# Patient Record
Sex: FEMALE | Race: WHITE | NOT HISPANIC OR LATINO | Employment: STUDENT | URBAN - METROPOLITAN AREA
[De-identification: names, ages, dates, MRNs, and addresses within clinical notes are randomized per-mention and may not be internally consistent; named-entity substitution may affect disease eponyms.]

---
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
## 2022-01-04 DIAGNOSIS — G43.709 CHRONIC MIGRAINE WITHOUT AURA WITHOUT STATUS MIGRAINOSUS, NOT INTRACTABLE: Primary | ICD-10-CM

## 2022-01-04 RX ORDER — BUTALBITAL, ACETAMINOPHEN AND CAFFEINE 50; 325; 40 MG/1; MG/1; MG/1
1 CAPSULE ORAL 4 TIMES DAILY PRN
Qty: 20 CAPSULE | Refills: 0 | Status: SHIPPED | OUTPATIENT
Start: 2022-01-04 | End: 2022-01-04

## 2022-01-04 RX ORDER — BUTALBITAL, ACETAMINOPHEN AND CAFFEINE 50; 325; 40 MG/1; MG/1; MG/1
1 CAPSULE ORAL 4 TIMES DAILY PRN
Qty: 20 CAPSULE | Refills: 0 | Status: SHIPPED | OUTPATIENT
Start: 2022-01-04 | End: 2022-08-18

## 2022-01-04 RX ORDER — PROMETHAZINE HYDROCHLORIDE 25 MG/1
25 TABLET ORAL EVERY 6 HOURS PRN
Qty: 30 TABLET | Refills: 0 | Status: SHIPPED | OUTPATIENT
Start: 2022-01-04 | End: 2022-01-04

## 2022-01-04 RX ORDER — PROMETHAZINE HYDROCHLORIDE 25 MG/1
25 TABLET ORAL EVERY 6 HOURS PRN
Qty: 30 TABLET | Refills: 0 | Status: SHIPPED | OUTPATIENT
Start: 2022-01-04 | End: 2022-08-18

## 2022-01-05 ENCOUNTER — TELEPHONE (OUTPATIENT)
Dept: NEUROLOGY | Facility: CLINIC | Age: 22
End: 2022-01-05
Payer: COMMERCIAL

## 2022-01-05 NOTE — TELEPHONE ENCOUNTER
CaseId:[id number];Status:Approved;Review Type:Prior Auth;Coverage Start Date:01/01/2022;Coverage End Date:01/05/2023;

## 2022-01-05 NOTE — TELEPHONE ENCOUNTER
CaseId:[id number];Status:Approved;Review Type:Prior Auth;Coverage Start Date:01/01/2022;Coverage End Date:01/05/2023;

## 2022-01-05 NOTE — TELEPHONE ENCOUNTER
Sheba Menezes ([place]: [id number])  Rx #: [id number]  Reyvow 50MG tablets     Form  [employer] Electronic PA Form (2017 NCPDP)

## 2022-01-05 NOTE — TELEPHONE ENCOUNTER
Sheba Menezes ([place]: [id number])  Rx #: [id number]  Nurtec 75MG dispersible tablets     Form  [employer] Electronic PA Form (2017 NCPDP)

## 2022-01-13 ENCOUNTER — TELEPHONE (OUTPATIENT)
Dept: NEUROLOGY | Facility: CLINIC | Age: 22
End: 2022-01-13
Payer: COMMERCIAL

## 2022-01-13 NOTE — TELEPHONE ENCOUNTER
----- Message from Sintia Nuno sent at 1/13/2022  4:32 PM CST -----  Contact: Melba (mother) @ [phone]  Pt is scheduled for Botox on tomorrow.  Pts mother is calling to make sure Dr Dailey received the medication so she does not make a wasted trip from [place].  Pls call.

## 2022-01-14 ENCOUNTER — PROCEDURE VISIT (OUTPATIENT)
Dept: NEUROLOGY | Facility: CLINIC | Age: 22
End: 2022-01-14
Payer: COMMERCIAL

## 2022-01-14 VITALS
HEIGHT: 61 IN | DIASTOLIC BLOOD PRESSURE: 69 MMHG | HEART RATE: 96 BPM | BODY MASS INDEX: 23.58 KG/M2 | SYSTOLIC BLOOD PRESSURE: 108 MMHG

## 2022-01-14 DIAGNOSIS — G43.709 CHRONIC MIGRAINE WITHOUT AURA WITHOUT STATUS MIGRAINOSUS, NOT INTRACTABLE: Primary | ICD-10-CM

## 2022-01-14 PROCEDURE — 64615 CHEMODENERV MUSC MIGRAINE: CPT | Mod: S$GLB,,, | Performed by: PSYCHIATRY & NEUROLOGY

## 2022-01-14 PROCEDURE — 64615 PR CHEMODENERVATION OF MUSCLE FOR CHRONIC MIGRAINE: ICD-10-PCS | Mod: S$GLB,,, | Performed by: PSYCHIATRY & NEUROLOGY
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
## 2022-01-22 DIAGNOSIS — N92.6 IRREGULAR PERIODS/MENSTRUAL CYCLES: ICD-10-CM

## 2022-01-24 RX ORDER — NORETHINDRONE ACETATE AND ETHINYL ESTRADIOL AND FERROUS FUMARATE 1MG-20(24)
1 KIT ORAL DAILY
Qty: 84 TABLET | Refills: 3 | Status: SHIPPED | OUTPATIENT
Start: 2022-01-24 | End: 2022-03-25 | Stop reason: SDUPTHER

## 2022-01-28 ENCOUNTER — PATIENT MESSAGE (OUTPATIENT)
Dept: NEUROLOGY | Facility: CLINIC | Age: 22
End: 2022-01-28
Payer: COMMERCIAL

## 2022-02-11 ENCOUNTER — TELEPHONE (OUTPATIENT)
Dept: SPORTS MEDICINE | Facility: CLINIC | Age: 22
End: 2022-02-11
Payer: COMMERCIAL

## 2022-02-11 NOTE — TELEPHONE ENCOUNTER
----- Message from Paradise Moya sent at 2/11/2022  8:39 AM CST -----  Regarding: Appt Access  Pt mother called to confirm if records were received from [place] with Dr. Banks Office? Requesting a call back.        [phone] ([place])

## 2022-02-11 NOTE — TELEPHONE ENCOUNTER
I called and left a message informing the patient's mother that we did not receive any documents. I advised that they get the documents, any imaging on a disc, etc. And bring it with them for the appointment on Monday. She was encouraged to return the call if needed

## 2022-02-14 ENCOUNTER — TELEPHONE (OUTPATIENT)
Dept: SPORTS MEDICINE | Facility: CLINIC | Age: 22
End: 2022-02-14
Payer: COMMERCIAL

## 2022-02-14 NOTE — TELEPHONE ENCOUNTER
Spoke to patients mother and informed her that the records still hasn't been received per Елена. Mom stated that she will get the records herself and bring them with her the the appointment that was rescheduled to 02/21.

## 2022-02-14 NOTE — TELEPHONE ENCOUNTER
----- Message from Daxa Ponce sent at 2/14/2022  8:06 AM CST -----  Pt mom would like to know if the records was received from [place] so they can come to the appt for today at 11am. Mom states the dr office says they just sent it over. Mom states if the records was not received Dr. Galvan wont see the pt. Pt would like a call back.    [phone] MOM

## 2022-02-21 ENCOUNTER — OFFICE VISIT (OUTPATIENT)
Dept: SPORTS MEDICINE | Facility: CLINIC | Age: 22
End: 2022-02-21
Payer: COMMERCIAL

## 2022-02-21 ENCOUNTER — HOSPITAL ENCOUNTER (OUTPATIENT)
Dept: RADIOLOGY | Facility: HOSPITAL | Age: 22
Discharge: HOME | End: 2022-02-21
Attending: ORTHOPAEDIC SURGERY
Payer: COMMERCIAL

## 2022-02-21 VITALS
DIASTOLIC BLOOD PRESSURE: 76 MMHG | HEIGHT: 61 IN | HEART RATE: 101 BPM | SYSTOLIC BLOOD PRESSURE: 118 MMHG | BODY MASS INDEX: 26.43 KG/M2 | WEIGHT: 140 LBS

## 2022-02-21 DIAGNOSIS — M25.551 RIGHT HIP PAIN: Primary | ICD-10-CM

## 2022-02-21 DIAGNOSIS — M25.551 RIGHT HIP PAIN: ICD-10-CM

## 2022-02-21 PROCEDURE — 1160F PR REVIEW ALL MEDS BY PRESCRIBER/CLIN PHARMACIST DOCUMENTED: ICD-10-PCS | Mod: CPTII,S$GLB,, | Performed by: ORTHOPAEDIC SURGERY

## 2022-02-21 PROCEDURE — 73503 X-RAY EXAM HIP UNI 4/> VIEWS: CPT | Mod: TC,RT

## 2022-02-21 PROCEDURE — 3008F PR BODY MASS INDEX (BMI) DOCUMENTED: ICD-10-PCS | Mod: CPTII,S$GLB,, | Performed by: ORTHOPAEDIC SURGERY

## 2022-02-21 PROCEDURE — 99204 OFFICE O/P NEW MOD 45 MIN: CPT | Mod: S$GLB,,, | Performed by: ORTHOPAEDIC SURGERY

## 2022-02-21 PROCEDURE — 1159F MED LIST DOCD IN RCRD: CPT | Mod: CPTII,S$GLB,, | Performed by: ORTHOPAEDIC SURGERY

## 2022-02-21 PROCEDURE — 73503 X-RAY EXAM HIP UNI 4/> VIEWS: CPT | Mod: 26,RT,, | Performed by: RADIOLOGY

## 2022-02-21 PROCEDURE — 99204 PR OFFICE/OUTPT VISIT, NEW, LEVL IV, 45-59 MIN: ICD-10-PCS | Mod: S$GLB,,, | Performed by: ORTHOPAEDIC SURGERY

## 2022-02-21 PROCEDURE — 1160F RVW MEDS BY RX/DR IN RCRD: CPT | Mod: CPTII,S$GLB,, | Performed by: ORTHOPAEDIC SURGERY

## 2022-02-21 PROCEDURE — 99999 PR PBB SHADOW E&M-EST. PATIENT-LVL IV: ICD-10-PCS | Mod: PBBFAC,,, | Performed by: ORTHOPAEDIC SURGERY

## 2022-02-21 PROCEDURE — 3078F PR MOST RECENT DIASTOLIC BLOOD PRESSURE < 80 MM HG: ICD-10-PCS | Mod: CPTII,S$GLB,, | Performed by: ORTHOPAEDIC SURGERY

## 2022-02-21 PROCEDURE — 3078F DIAST BP <80 MM HG: CPT | Mod: CPTII,S$GLB,, | Performed by: ORTHOPAEDIC SURGERY

## 2022-02-21 PROCEDURE — 99999 PR PBB SHADOW E&M-EST. PATIENT-LVL IV: CPT | Mod: PBBFAC,,, | Performed by: ORTHOPAEDIC SURGERY

## 2022-02-21 PROCEDURE — 3074F SYST BP LT 130 MM HG: CPT | Mod: CPTII,S$GLB,, | Performed by: ORTHOPAEDIC SURGERY

## 2022-02-21 PROCEDURE — 1159F PR MEDICATION LIST DOCUMENTED IN MEDICAL RECORD: ICD-10-PCS | Mod: CPTII,S$GLB,, | Performed by: ORTHOPAEDIC SURGERY

## 2022-02-21 PROCEDURE — 73503 XR HIP WITH PELVIS WHEN PERFORMED, 4 OR MORE VIEWS RIGHT: ICD-10-PCS | Mod: 26,RT,, | Performed by: RADIOLOGY

## 2022-02-21 PROCEDURE — 3008F BODY MASS INDEX DOCD: CPT | Mod: CPTII,S$GLB,, | Performed by: ORTHOPAEDIC SURGERY

## 2022-02-21 PROCEDURE — 3074F PR MOST RECENT SYSTOLIC BLOOD PRESSURE < 130 MM HG: ICD-10-PCS | Mod: CPTII,S$GLB,, | Performed by: ORTHOPAEDIC SURGERY

## 2022-02-21 NOTE — PROGRESS NOTES
CC: right hip pain    HPI:   Sheba Menezes is a pleasant 21 y.o. patient who reports to clinic with right hip pain. She had a right hip arthroscopy, synovectomy, femoroplasty, and partial iliopsoas release in 2017 with a Dr Banks in [place].  She did well after but did not have complete relief of her pain.  She localizes her pain to her lower back and the lateral aspect of her leg.  She has had several greater trochanter bursa injections without relief.  Musical theater major, active in dance, has pain with dancing.     Affecting ADLs and exercising    Running activity worst    SANE: 60%    Review of Systems   Constitution: Negative. Negative for chills, fever and night sweats.   HENT: Negative for congestion and headaches.    Eyes: Negative for blurred vision, left vision loss and right vision loss.   Cardiovascular: Negative for chest pain and syncope.   Respiratory: Negative for cough and shortness of breath.    Endocrine: Negative for polydipsia, polyphagia and polyuria.   Hematologic/Lymphatic: Negative for bleeding problem. Does not bruise/bleed easily.   Skin: Negative for dry skin, itching and rash.   Musculoskeletal: Negative for falls and muscle weakness.   Gastrointestinal: Negative for abdominal pain and bowel incontinence.   Genitourinary: Negative for bladder incontinence and nocturia.   Neurological: Negative for disturbances in coordination, loss of balance and seizures.   Psychiatric/Behavioral: Negative for depression. The patient does not have insomnia.    Allergic/Immunologic: Negative for hives and persistent infections.     PAST MEDICAL HISTORY:   Past Medical History:   Diagnosis Date    Anxiety     Borderline diabetes     Depression     Migraine     Polycystic ovary      PAST SURGICAL HISTORY:   Past Surgical History:   Procedure Laterality Date    HIP SURGERY       FAMILY HISTORY:   Family History   Problem Relation Age of Onset    Breast cancer Paternal Aunt      SOCIAL  HISTORY:   Social History     Socioeconomic History    Marital status: Single   Tobacco Use    Smoking status: Never Smoker    Smokeless tobacco: Never Used   Substance and Sexual Activity    Alcohol use: Yes     Comment: rare    Drug use: Never    Sexual activity: Never     Birth control/protection: OCP       MEDICATIONS:   Current Outpatient Medications:     butalbital-acetaminophen-caff -40 mg -40 mg Cap, Take 1 capsule by mouth 4 (four) times daily as needed (headache)., Disp: 20 capsule, Rfl: 0    EScitalopram oxalate (LEXAPRO) 5 MG Tab, Take 5 mg by mouth once daily., Disp: , Rfl:     famotidine (PEPCID) 20 MG tablet, Take 20 mg by mouth 2 (two) times daily., Disp: , Rfl:     galcanezumab-gnlm (EMGALITY PEN) 120 mg/mL PnIj, Inject 120 mg into the skin every 28 days., Disp: 1 mL, Rfl: 12    lasmiditan (REYVOW) tablet 50 mg, Take 50 mg by mouth daily as needed (migraine)., Disp: 8 tablet, Rfl: 4    levothyroxine (SYNTHROID) 50 MCG tablet, Take 50 mcg by mouth every morning., Disp: , Rfl:     magnesium oxide (MAG-OX) 400 mg (241.3 mg magnesium) tablet, Take 1 tablet (400 mg total) by mouth 2 (two) times daily., Disp: 60 tablet, Rfl: 12    norethindrone-e.estradioL-iron (MIBELAS 24 FE) 1 mg-20 mcg(24) /75 mg (4) Chew, Take 1 tablet by mouth once daily., Disp: 84 tablet, Rfl: 3    ONEXTON 1.2 %(1 % base) -3.75 % GlwP, Apply 1 application topically every evening. Apply to affected area, Disp: , Rfl:     phentermine (ADIPEX-P) 37.5 mg tablet, Take 37.5 mg by mouth once daily., Disp: , Rfl:     PLEXION 9.8-4.8 % Clsr, WASH BACK AND CHEST EVERY DAY TO TWICE DAILY, Disp: , Rfl:     promethazine (PHENERGAN) 25 MG tablet, Take 1 tablet (25 mg total) by mouth every 6 (six) hours as needed for Nausea., Disp: 30 tablet, Rfl: 0    rimegepant 75 mg odt, Take 1 tablet (75 mg total) by mouth daily as needed for Migraine. Place ODT tablet on the tongue; alternatively the ODT tablet may be placed  "under the tongue, Disp: 8 tablet, Rfl: 11    XHANCE 93 mcg/actuation AerB, USE 1 SPRAY IN EACH NOSTRIL TWICE DAILY AS DIRECTED, Disp: , Rfl:     Current Facility-Administered Medications:     onabotulinumtoxina injection 200 Units, 200 Units, Intramuscular, Q90 Days, Galen Dailey MD  ALLERGIES: Review of patient's allergies indicates:  No Known Allergies    VITAL SIGNS: /76   Pulse 101   Ht 5' 1" (1.549 m)   Wt 63.5 kg (140 lb)   BMI 26.45 kg/m²        PHYSICAL EXAM /  HIP  PHYSICAL EXAMINATION  General:  The patient is alert and oriented x 3.  Mood is pleasant.  Observation of ears, eyes and nose reveal no gross abnormalities.  HEENT: NCAT, sclera nonicteric  Lungs: Respirations are equal and unlabored..    right HIP EXAMINATION     OBSERVATION / INSPECTION  Gait:   Nonantalgic   Alignment:  Neutral   Scars:   None   Muscle atrophy: None   Effusion:  None   Warmth:  None   Discoloration:   None   Leg lengths:   Equal   Pelvis:   Level     TENDERNESS / CREPITUS (T/C):      T / C  Trochanteric bursa   - / -  Piriformis    - / -  SI joint    + / -  Psoas tendon   - / -  Rectus insertion  - / -  Adductor insertion  - / -  Pubic symphysis  - / -  Lumbar spine   + / -    ROM: (* = pain)    Flexion:    120 degrees  External rotation: 40 degrees  Internal rotation with axial load: 30 degrees  Internal rotation without axial load: 40 degrees  Abduction:  45 degrees  Adduction:   20 degrees    SPECIAL TESTS:  Pain w/ forced internal rotation (FADIR): -   Pain w/ forced external rotation (AL): Absent   Circumduction test:    -  Stinchfield test:    Negative   Log roll:      Negative   Snapping hip (internal):   Negative   Sit-up pain:     Negative   Resisted sit-up pain:    Negative   Resisted sit-up with adductor contraction pain:  Negative   Step-down test:    +  Trendelenburg test:    Negative  Bridge test     +     EXTREMITY NEURO-VASCULAR EXAMINATION:   Sensation:  Grossly intact to light touch all " dermatomal regions.   Motor Function:  Fully intact motor function at hip, knee, foot and ankle    DTRs;  quadriceps and  achilles 2+.  No clonus and downgoing Babinski.    Vascular status:  DP and PT pulses 2+, brisk capillary refill, symmetric.    Skin:  intact, compartments soft.    OTHER FINDINGS:  Positive AL test (SI joint)  Weakness with straight leg raise    XRAYS:  CE angle: 25  Crossover: Neg  CAM: Neg  Joint space narrowing: none    ASSESSMENT:    1. right hip abd/core weakness    PLAN:  1. PT for right hip abd/core strengthing  2. NSAIDS prn  3. Avoid exacerbating activities  4. All questions were answered, pt will contact us for questions or concerns in the interim.

## 2022-02-25 ENCOUNTER — TELEPHONE (OUTPATIENT)
Dept: SPORTS MEDICINE | Facility: CLINIC | Age: 22
End: 2022-02-25
Payer: COMMERCIAL

## 2022-02-25 NOTE — TELEPHONE ENCOUNTER
I called the number provided and left a voicemail encouraging them to call back with a location name, address, phone and fax number

## 2022-02-25 NOTE — TELEPHONE ENCOUNTER
----- Message from Ely Sharma sent at 2/25/2022  4:00 PM CST -----    Please place referral for patients physical therapy at your earliest opportunity  Please notify patients grandmother and they will call to schedule    [place] - Grand mother @# [phone]

## 2022-02-28 ENCOUNTER — TELEPHONE (OUTPATIENT)
Dept: SPORTS MEDICINE | Facility: CLINIC | Age: 22
End: 2022-02-28
Payer: COMMERCIAL

## 2022-02-28 NOTE — TELEPHONE ENCOUNTER
----- Message from Yomaira Morgan sent at 2/28/2022 11:23 AM CST -----  Pt mom calling to speak with staff      [phone] (home) [phone] (work)

## 2022-03-02 ENCOUNTER — TELEPHONE (OUTPATIENT)
Dept: SPORTS MEDICINE | Facility: CLINIC | Age: 22
End: 2022-03-02
Payer: COMMERCIAL

## 2022-03-02 DIAGNOSIS — M25.551 RIGHT HIP PAIN: Primary | ICD-10-CM

## 2022-03-02 NOTE — TELEPHONE ENCOUNTER
----- Message from Estee Penny sent at 3/2/2022 11:10 AM CST -----  Contact: Pt  Pts mother returning a call back from [facility]. Mother has left several messages regarding call back.    Confirmed contact info below:  Contact Name: Sheba Menezes  Phone Number: [phone]

## 2022-03-22 DIAGNOSIS — G43.709 CHRONIC MIGRAINE WITHOUT AURA WITHOUT STATUS MIGRAINOSUS, NOT INTRACTABLE: Primary | ICD-10-CM

## 2022-03-31 ENCOUNTER — TELEPHONE (OUTPATIENT)
Dept: NEUROLOGY | Facility: CLINIC | Age: 22
End: 2022-03-31
Payer: COMMERCIAL

## 2022-03-31 NOTE — TELEPHONE ENCOUNTER
----- Message from Estee Penny sent at 3/31/2022  3:10 PM CDT -----  Contact: Pt  Pts requesting a call back regarding coming at a earlier time for appt date.. Pt is scheduled for 1 but mother is concerned about pt driving to [place] that time of evening.. Pt will be available anytime of day     Confirmed contact info below:  Contact Name:  Mom   Phone Number: [phone]

## 2022-04-08 ENCOUNTER — TELEPHONE (OUTPATIENT)
Dept: PHARMACY | Facility: CLINIC | Age: 22
End: 2022-04-08
Payer: COMMERCIAL

## 2022-04-14 ENCOUNTER — PROCEDURE VISIT (OUTPATIENT)
Dept: NEUROLOGY | Facility: CLINIC | Age: 22
End: 2022-04-14
Payer: COMMERCIAL

## 2022-04-14 VITALS
SYSTOLIC BLOOD PRESSURE: 97 MMHG | BODY MASS INDEX: 26.33 KG/M2 | HEART RATE: 104 BPM | DIASTOLIC BLOOD PRESSURE: 66 MMHG | WEIGHT: 139.44 LBS | HEIGHT: 61 IN

## 2022-04-14 DIAGNOSIS — G43.709 CHRONIC MIGRAINE WITHOUT AURA WITHOUT STATUS MIGRAINOSUS, NOT INTRACTABLE: ICD-10-CM

## 2022-04-14 PROCEDURE — 64615 PR CHEMODENERVATION OF MUSCLE FOR CHRONIC MIGRAINE: ICD-10-PCS | Mod: S$GLB,,, | Performed by: PSYCHIATRY & NEUROLOGY

## 2022-04-14 PROCEDURE — 64615 CHEMODENERV MUSC MIGRAINE: CPT | Mod: S$GLB,,, | Performed by: PSYCHIATRY & NEUROLOGY
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
## 2022-04-21 DIAGNOSIS — G43.709 CHRONIC MIGRAINE WITHOUT AURA WITHOUT STATUS MIGRAINOSUS, NOT INTRACTABLE: Primary | ICD-10-CM

## 2022-04-21 RX ORDER — ETODOLAC 400 MG/1
400 TABLET, FILM COATED ORAL 3 TIMES DAILY PRN
Qty: 20 TABLET | Refills: 12 | Status: SHIPPED | OUTPATIENT
Start: 2022-04-21 | End: 2022-08-18

## 2022-04-21 NOTE — TELEPHONE ENCOUNTER
Call placed to patient mother. Patient mother states that the patient will be out of town in [place] from may through august.     Requesting samples from the office for emgality as she states that she is concerned for her daughter having to travel to and from a pharmacy while in [place]. Informed her that [facility] does not carry samples as it's against policy. Discussed with her option for mail delivery pharmacy. To research which pharmacy patient would prefer and notify office for script to be sent    Patient to need botox while out of state. Discussed with her that she will need to locate at [facility] that would complete her botox and notify office of location and fax number to send referral. She states that Dr. Dailey said they would send the botox to the new provider. I discuess with patient that we will not be able to mail any of our botox supply to the new provider. The new provider would have to have their own supply/etc.     She states that the patient requested her mother discuss with clinic need for new or additional rescue as she states the reyvow and the nurtec are no longer working for her migraines. She states that whatever alternative that she is given please not make her sleepy. Informed her that this request will be forwarded to Dr. Dailey

## 2022-04-21 NOTE — TELEPHONE ENCOUNTER
Call placed to patient. Informed of new script etodolac 400mg TID PRN headaches. Advised to take with food and to avoid OTC NSAIDS. Verbalized understanding and denied further needs at this time.

## 2022-04-21 NOTE — TELEPHONE ENCOUNTER
----- Message from Shailesh Marrufo sent at 4/21/2022 11:39 AM CDT -----  Regarding: call bk from the nurse      The Pt's mother states that she has permission to speak with you on behalf of the Pt and would like for Lizeth to call her back.    The caller states that she sent a msg to you yesterday    Ph # [phone] (Melba English)

## 2022-04-22 NOTE — TELEPHONE ENCOUNTER
Call placed to Pt mother.     Mother provided new neuro for while she's in [place]  Jai Gely Fax: [phone]  Ref: Sukumar    She also states that she will not be needing mail delivery as [facility] pharmacy states they can override and get her the summer supply before she leaves. Informed her to call back if she has any trouble.     Mother/patient to call in summer to schedule next botox appt

## 2022-04-22 NOTE — TELEPHONE ENCOUNTER
Zuleyka Thompson, Patient Care Assistant  Lizeth Egan, RN  Caller: Pt mother (Yesterday,  3:25 PM)  Pt mother is requesting a call back in regards to some information that she has and needs to pass on to you.       Pt mother @ [phone]

## 2022-05-13 ENCOUNTER — TELEPHONE (OUTPATIENT)
Dept: NEUROLOGY | Facility: CLINIC | Age: 22
End: 2022-05-13

## 2022-05-13 NOTE — TELEPHONE ENCOUNTER
----- Message from Daxa Ponce sent at 5/13/2022  3:43 PM CDT -----  Contact: [phone] Mom  Pt mom would like to let Sydnie know she needs her to give her a call in reference to the medication that was given to her daughter a month ago.    [phone] Mom

## 2022-05-16 ENCOUNTER — TELEPHONE (OUTPATIENT)
Dept: NEUROLOGY | Facility: CLINIC | Age: 22
End: 2022-05-16
Payer: COMMERCIAL

## 2022-05-16 NOTE — TELEPHONE ENCOUNTER
----- Message from Mario Ontiveros sent at 5/16/2022 12:16 PM CDT -----  Regarding: Pt refill  Contact: Regina @ [phone]  Pt's mother (Regina) called in regards to medicine: etodolac (LODINE) 400 MG tablet and needing a refill but also has questions. Please call pt's mother.

## 2022-05-17 DIAGNOSIS — R51.9 INTRACTABLE HEADACHE, UNSPECIFIED CHRONICITY PATTERN, UNSPECIFIED HEADACHE TYPE: Primary | ICD-10-CM

## 2022-05-17 RX ORDER — DIHYDROERGOTAMINE MESYLATE 4 MG/ML
1.5 SPRAY, METERED NASAL 2 TIMES DAILY PRN
Qty: 6 ML | Refills: 4 | Status: SHIPPED | OUTPATIENT
Start: 2022-05-17 | End: 2022-05-18 | Stop reason: SDUPTHER

## 2022-05-17 NOTE — TELEPHONE ENCOUNTER
Call placed to suhas. Mother states that she is just waking up and requesting a follow up call later today.

## 2022-05-18 DIAGNOSIS — R51.9 INTRACTABLE HEADACHE, UNSPECIFIED CHRONICITY PATTERN, UNSPECIFIED HEADACHE TYPE: ICD-10-CM

## 2022-05-18 RX ORDER — DIHYDROERGOTAMINE MESYLATE 4 MG/ML
1.5 SPRAY, METERED NASAL 2 TIMES DAILY PRN
Qty: 6 ML | Refills: 4 | Status: CANCELLED | OUTPATIENT
Start: 2022-05-18

## 2022-05-18 RX ORDER — DIHYDROERGOTAMINE MESYLATE 4 MG/ML
1.5 SPRAY, METERED NASAL 2 TIMES DAILY PRN
Qty: 6 ML | Refills: 4 | Status: SHIPPED | OUTPATIENT
Start: 2022-05-18 | End: 2022-07-08 | Stop reason: SDUPTHER

## 2022-05-20 ENCOUNTER — TELEPHONE (OUTPATIENT)
Dept: NEUROLOGY | Facility: CLINIC | Age: 22
End: 2022-05-20
Payer: COMMERCIAL

## 2022-05-20 DIAGNOSIS — R51.9 INTRACTABLE HEADACHE, UNSPECIFIED CHRONICITY PATTERN, UNSPECIFIED HEADACHE TYPE: ICD-10-CM

## 2022-05-20 NOTE — TELEPHONE ENCOUNTER
----- Message from Lorraine Tello MA sent at 5/20/2022  1:45 PM CDT -----  Regarding: FW: refill  Contact: Delmis @ [phone]    ----- Message -----  From: Tremontana Chevalier  Sent: 5/20/2022   1:32 PM CDT  To: Ashlie Aaron Staff  Subject: refill                                           Mother (Delmis) calling to get Pharm Auth for insurance approval for dihydroergotamine (TRUDHESA) 0.725 mg/pump act. (4 mg/mL) Harshil Benites says pt is leaving to go out of town and need this RX as soon as possible to make sure the medication works for pt. Pls call @ [phone].      [id number] - [place], [place] - [address]  [address] [address]  Phone: [phone] Fax: [phone]

## 2022-05-20 NOTE — TELEPHONE ENCOUNTER
Sheba Menezes Garvey: [id number]     Drug  Trudhesa 0.725MG/ACT aerosol    Form  [employer] Electronic PA Form (2017 NCPDP)    CaseId:[id number];Status:Approved;Review Type:Prior Auth;Coverage Start Date:05/20/2022;Coverage End Date:05/20/2023;

## 2022-05-26 DIAGNOSIS — G43.001 MIGRAINE WITHOUT AURA AND WITH STATUS MIGRAINOSUS, NOT INTRACTABLE: ICD-10-CM

## 2022-05-26 DIAGNOSIS — N92.6 IRREGULAR PERIODS/MENSTRUAL CYCLES: ICD-10-CM

## 2022-05-26 RX ORDER — GALCANEZUMAB 120 MG/ML
120 INJECTION, SOLUTION SUBCUTANEOUS
Qty: 1 ML | Refills: 3 | Status: SHIPPED | OUTPATIENT
Start: 2022-05-26 | End: 2022-09-12 | Stop reason: SDUPTHER

## 2022-05-26 RX ORDER — NORETHINDRONE ACETATE AND ETHINYL ESTRADIOL AND FERROUS FUMARATE 1MG-20(24)
1 KIT ORAL DAILY
Qty: 28 TABLET | Refills: 1 | Status: SHIPPED | OUTPATIENT
Start: 2022-05-26 | End: 2022-06-02 | Stop reason: SDUPTHER

## 2022-05-26 NOTE — TELEPHONE ENCOUNTER
----- Message from Danielle Hoyt sent at 5/26/2022 12:55 PM CDT -----  Regarding: URGENT  Contact: Liz  @[phone]  Pt mother needs a call back regarding getting a message to the pharmacy in [place]. She is requesting a call back ASAP

## 2022-05-26 NOTE — TELEPHONE ENCOUNTER
Call returned by Liz. She states that trudhesa should be arriving soon but had issues going back and forth with pharmacy to obtain.     Reports that Emgality override not possible according to [employer]. Requesting medication be sent to [facility] in [facility]

## 2022-06-02 DIAGNOSIS — N92.6 IRREGULAR PERIODS/MENSTRUAL CYCLES: ICD-10-CM

## 2022-06-02 RX ORDER — NORETHINDRONE ACETATE AND ETHINYL ESTRADIOL AND FERROUS FUMARATE 1MG-20(24)
1 KIT ORAL DAILY
Qty: 28 TABLET | Refills: 1 | Status: SHIPPED | OUTPATIENT
Start: 2022-06-02 | End: 2022-08-18 | Stop reason: SDUPTHER

## 2022-06-30 ENCOUNTER — TELEPHONE (OUTPATIENT)
Dept: NEUROLOGY | Facility: CLINIC | Age: 22
End: 2022-06-30

## 2022-06-30 NOTE — TELEPHONE ENCOUNTER
----- Message from Veronica Ortega sent at 6/30/2022  3:46 PM CDT -----  Regarding: Disablity paperwork  Contact: Meghan/mom [phone]  Calling to speak to Lizeth regarding paperwork that  needs to be sent to provider. No further information given.
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
## 2022-07-07 ENCOUNTER — TELEPHONE (OUTPATIENT)
Dept: NEUROLOGY | Facility: CLINIC | Age: 22
End: 2022-07-07
Payer: COMMERCIAL

## 2022-07-07 NOTE — TELEPHONE ENCOUNTER
----- Message from Karina Bruno sent at 7/7/2022  2:26 PM CDT -----  Type: RX Refill Request    Who Called: pt    Have you contacted your pharmacy:    Refill or New Rx:dihydroergotamine (TRUDHESA) 0.725 mg/pump act. (4 mg/mL) Spry    RX Name and Strength:    How is the patient currently taking it? (ex. 1XDay):    Is this a 30 day or 90 day RX:    Preferred Pharmacy with phone number:   [facility] #[id number] [address] AT [address] [address]  Phone: [phone] Fax: [phone]        Local or Mail Order:    Ordering Provider:    Would the patient rather a call back or a response via [facility]? call    Best Call Back Number:[phone] (home)       Additional Information:

## 2022-07-08 DIAGNOSIS — R51.9 INTRACTABLE HEADACHE, UNSPECIFIED CHRONICITY PATTERN, UNSPECIFIED HEADACHE TYPE: ICD-10-CM

## 2022-07-08 RX ORDER — DIHYDROERGOTAMINE MESYLATE 4 MG/ML
1.5 SPRAY, METERED NASAL 2 TIMES DAILY PRN
Qty: 12 ML | Refills: 4 | Status: SHIPPED | OUTPATIENT
Start: 2022-07-08 | End: 2022-08-18

## 2022-07-19 ENCOUNTER — TELEPHONE (OUTPATIENT)
Dept: NEUROLOGY | Facility: CLINIC | Age: 22
End: 2022-07-19
Payer: COMMERCIAL

## 2022-07-19 NOTE — TELEPHONE ENCOUNTER
----- Message from Eladia Stratton sent at 7/19/2022 10:30 AM CDT -----  Sheba Mckinney calling regarding Appointment Access  (message) for Botox. She stated she been calling since Friday, and no one called her back as of today.   call back [phone]

## 2022-08-18 ENCOUNTER — OFFICE VISIT (OUTPATIENT)
Dept: OBSTETRICS AND GYNECOLOGY | Facility: CLINIC | Age: 22
End: 2022-08-18
Payer: COMMERCIAL

## 2022-08-18 VITALS
HEART RATE: 99 BPM | WEIGHT: 153 LBS | HEIGHT: 61 IN | BODY MASS INDEX: 28.89 KG/M2 | DIASTOLIC BLOOD PRESSURE: 73 MMHG | SYSTOLIC BLOOD PRESSURE: 108 MMHG

## 2022-08-18 DIAGNOSIS — Z30.9 ENCOUNTER FOR CONTRACEPTIVE MANAGEMENT, UNSPECIFIED TYPE: ICD-10-CM

## 2022-08-18 DIAGNOSIS — R63.5 WEIGHT GAIN: ICD-10-CM

## 2022-08-18 DIAGNOSIS — Z01.419 GYNECOLOGIC EXAM NORMAL: Primary | ICD-10-CM

## 2022-08-18 DIAGNOSIS — N92.6 IRREGULAR PERIODS/MENSTRUAL CYCLES: ICD-10-CM

## 2022-08-18 DIAGNOSIS — N92.6 IRREGULAR MENSTRUAL CYCLE: ICD-10-CM

## 2022-08-18 PROCEDURE — 3008F PR BODY MASS INDEX (BMI) DOCUMENTED: ICD-10-PCS | Mod: CPTII,S$GLB,, | Performed by: NURSE PRACTITIONER

## 2022-08-18 PROCEDURE — 1159F MED LIST DOCD IN RCRD: CPT | Mod: CPTII,S$GLB,, | Performed by: NURSE PRACTITIONER

## 2022-08-18 PROCEDURE — 1160F PR REVIEW ALL MEDS BY PRESCRIBER/CLIN PHARMACIST DOCUMENTED: ICD-10-PCS | Mod: CPTII,S$GLB,, | Performed by: NURSE PRACTITIONER

## 2022-08-18 PROCEDURE — 3074F SYST BP LT 130 MM HG: CPT | Mod: CPTII,S$GLB,, | Performed by: NURSE PRACTITIONER

## 2022-08-18 PROCEDURE — 3078F DIAST BP <80 MM HG: CPT | Mod: CPTII,S$GLB,, | Performed by: NURSE PRACTITIONER

## 2022-08-18 PROCEDURE — 99395 PR PREVENTIVE VISIT,EST,18-39: ICD-10-PCS | Mod: S$GLB,,, | Performed by: NURSE PRACTITIONER

## 2022-08-18 PROCEDURE — 3074F PR MOST RECENT SYSTOLIC BLOOD PRESSURE < 130 MM HG: ICD-10-PCS | Mod: CPTII,S$GLB,, | Performed by: NURSE PRACTITIONER

## 2022-08-18 PROCEDURE — 1159F PR MEDICATION LIST DOCUMENTED IN MEDICAL RECORD: ICD-10-PCS | Mod: CPTII,S$GLB,, | Performed by: NURSE PRACTITIONER

## 2022-08-18 PROCEDURE — 3078F PR MOST RECENT DIASTOLIC BLOOD PRESSURE < 80 MM HG: ICD-10-PCS | Mod: CPTII,S$GLB,, | Performed by: NURSE PRACTITIONER

## 2022-08-18 PROCEDURE — 1160F RVW MEDS BY RX/DR IN RCRD: CPT | Mod: CPTII,S$GLB,, | Performed by: NURSE PRACTITIONER

## 2022-08-18 PROCEDURE — 99395 PREV VISIT EST AGE 18-39: CPT | Mod: S$GLB,,, | Performed by: NURSE PRACTITIONER

## 2022-08-18 PROCEDURE — 3008F BODY MASS INDEX DOCD: CPT | Mod: CPTII,S$GLB,, | Performed by: NURSE PRACTITIONER

## 2022-08-18 RX ORDER — NORETHINDRONE ACETATE AND ETHINYL ESTRADIOL AND FERROUS FUMARATE 1MG-20(24)
1 KIT ORAL DAILY
Qty: 84 TABLET | Refills: 3 | Status: SHIPPED | OUTPATIENT
Start: 2022-08-18 | End: 2022-08-18

## 2022-08-18 RX ORDER — NORETHINDRONE ACETATE AND ETHINYL ESTRADIOL AND FERROUS FUMARATE 1MG-20(24)
1 KIT ORAL DAILY
Qty: 84 TABLET | Refills: 3 | Status: SHIPPED | OUTPATIENT
Start: 2022-08-18

## 2022-08-18 NOTE — PROGRESS NOTES
"  Subjective:       Patient ID: Sheba Menezes is a 21 y.o. female.    Chief Complaint:  Well Woman      History of Present Illness  HPI  Annual Exam-Premenopausal  Patient presents for annual exam. The patient has no complaints today.  GYN screening history: no prior history of gyn screening tests. The patient wears seatbelts: yes. The patient participates in regular exercise: yes. Has the patient ever been transfused or tattooed?: not asked. The patient reports that there is not domestic violence in her life. Reports that she is having issues losing weight despite eating healthy and exercising. Reports that she skipped cycle in July    Outpatient Medications Marked as Taking for the 22 encounter (Office Visit) with Tricia Adame NP   Medication Sig Dispense Refill    galcanezumab-gnlm (EMGALITY PEN) 120 mg/mL PnIj Inject 120 mg into the skin every 28 days. 1 mL 3    [DISCONTINUED] norethindrone-e.estradioL-iron (MIBELAS 24 FE) 1 mg-20 mcg(24) /75 mg (4) Chew Take 1 tablet by mouth once daily. 28 tablet 1     Current Facility-Administered Medications for the 22 encounter (Office Visit) with Tricia Adame NP   Medication Dose Route Frequency Provider Last Rate Last Admin    onabotulinumtoxina injection 200 Units  200 Units Intramuscular Q90 Days Galen Dailey MD   200 Units at 22 1216     Vitals:    22 1417   BP: 108/73   Pulse: 99   Weight: 69.4 kg (153 lb)   Height: 5' 1" (1.549 m)     Past Medical History:   Diagnosis Date    Anxiety     Borderline diabetes     Depression     Migraine     Polycystic ovary      Past Surgical History:   Procedure Laterality Date    HIP SURGERY           GYN & OB History  Patient's last menstrual period was 2022.   Date of Last Pap: No result found    OB History    Para Term  AB Living   0 0 0 0 0 0   SAB IAB Ectopic Multiple Live Births   0 0 0 0 0       Review of Systems  Review of Systems   Constitutional: Positive " for unexpected weight change. Negative for activity change, appetite change, chills, fatigue and fever.   HENT: Negative for nasal congestion and tinnitus.    Eyes: Negative for visual disturbance.   Respiratory: Negative for cough and shortness of breath.    Cardiovascular: Negative for chest pain and palpitations.   Gastrointestinal: Negative for abdominal pain, bloating, blood in stool, constipation, nausea and vomiting.   Endocrine: Negative for diabetes, hair loss and hot flashes.   Genitourinary: Negative for bladder incontinence, decreased libido, dysmenorrhea, dyspareunia, dysuria, flank pain, frequency, genital sores, hematuria, hot flashes, menorrhagia, menstrual problem, pelvic pain, urgency, vaginal bleeding, vaginal discharge, vaginal pain, urinary incontinence, postcoital bleeding, postmenopausal bleeding, vaginal dryness and vaginal odor.   Musculoskeletal: Negative for arthralgias, back pain, leg pain and myalgias.   Integumentary:  Negative for rash, acne, hair changes, mole/lesion, breast mass, nipple discharge, breast skin changes and breast tenderness.   Neurological: Negative for vertigo, syncope, numbness and headaches.   Hematological: Does not bruise/bleed easily.   Psychiatric/Behavioral: Negative for depression and sleep disturbance. The patient is not nervous/anxious.    Breast: Negative for asymmetry, lump, mass, mastodynia, nipple discharge, skin changes and tenderness          Objective:    Physical Exam:   Constitutional: She appears well-developed and well-nourished. She is cooperative.    HENT:   Nose: No epistaxis.      Cardiovascular: Normal rate, regular rhythm and normal heart sounds.     Pulmonary/Chest: Effort normal and breath sounds normal. No respiratory distress.        Abdominal: Soft and flat. Bowel sounds are normal.                Lymphadenopathy:     She has no cervical adenopathy.    Neurological: She is alert.     Psychiatric: Her speech is normal and behavior is  normal. Mood, memory, affect, judgment and thought content normal.          Assessment:        1. Gynecologic exam normal    2. Encounter for contraceptive management, unspecified type    3. Weight gain    4. Irregular menstrual cycle    5. Irregular periods/menstrual cycles                Plan:      Gynecologic exam normal  -     Discontinue: norethindrone-e.estradioL-iron (MIBELAS 24 FE) 1 mg-20 mcg(24) /75 mg (4) Chew; Take 1 tablet by mouth once daily.  Dispense: 84 tablet; Refill: 3  -     norethindrone-e.estradioL-iron (MIBELAS 24 FE) 1 mg-20 mcg(24) /75 mg (4) Chew; Take 1 tablet by mouth once daily.  Dispense: 84 tablet; Refill: 3    Encounter for contraceptive management, unspecified type    Weight gain  -     Insulin, Random; Future; Expected date: 08/18/2022  -     Testosterone; Future; Expected date: 08/18/2022  -     Testosterone, Free; Future; Expected date: 08/18/2022  -     TSH; Future; Expected date: 08/18/2022  -     Comprehensive Metabolic Panel; Future; Expected date: 08/18/2022    Irregular menstrual cycle  -     Insulin, Random; Future; Expected date: 08/18/2022  -     Testosterone; Future; Expected date: 08/18/2022  -     Testosterone, Free; Future; Expected date: 08/18/2022  -     TSH; Future; Expected date: 08/18/2022  -     Comprehensive Metabolic Panel; Future; Expected date: 08/18/2022    Irregular periods/menstrual cycles  -     Discontinue: norethindrone-e.estradioL-iron (MIBELAS 24 FE) 1 mg-20 mcg(24) /75 mg (4) Chew; Take 1 tablet by mouth once daily.  Dispense: 84 tablet; Refill: 3  -     norethindrone-e.estradioL-iron (MIBELAS 24 FE) 1 mg-20 mcg(24) /75 mg (4) Chew; Take 1 tablet by mouth once daily.  Dispense: 84 tablet; Refill: 3      We will check labs, poss IR?    Patient education provided on oral contraception, in addition to what to expect during the initiation period, including the following topics:   Breakthrough bleeding or spotting may occur but should spontaneously  resolve. Allow up to 3 months for your body to adjust to the regimen, most cases will resolve spontaneously.   For pregnancy prevention, utilize a backup method of contraception for the first 7 days   Take the pill at the same time every day, set an alarm to serve as a reminder if necessary    If nausea or vomiting occurs when taking the medication, take with food or at bedtime   Missing doses may lead to breakthrough bleeding    Your periods may become lighter and/or shorter in duration than usual   If a pill is missed for one day, take 2 pills the next day   If two consecutive days are missed, take 2 pills for the next 2 days to catch up and finish the birth control pack. Utilize a backup method of contraception for the remainder of the pill pack.   Certain medications, especially antibiotics, may decrease the effectiveness of the pill   Oral contraceptives do not provide protection against sexually transmitted diseases    Patient educated on symptoms indicating the need for emergent care and should be reported to the provider if experienced, including:   Severe abdominal pain   Severe chest pain   Severe headache or paresthesia    Changes in vision   Severe leg pain or swelling   Shortness of breath and increased heart rate     Patient was counseled today on current ASCCP pap guidelines, the recommendation for yearly pelvic exams, healthy diet and exercise routines, annual mammograms starting at age 40, and breast self awareness. She is to see her PCP for other health maintenance.     Follow up in about 1 year (around 8/18/2023).

## 2022-08-19 LAB
ALBUMIN SERPL-MCNC: 4.3 G/DL (ref 3.5–5.2)
ALBUMIN/GLOB SERPL ELPH: 1.6 {RATIO} (ref 1–2.7)
ALP ISOS SERPL LEV INH-CCNC: 77 U/L (ref 45–87)
ALT (SGPT): 12 U/L (ref 0–33)
ANION GAP SERPL CALC-SCNC: 9 MMOL/L (ref 8–17)
AST SERPL-CCNC: 18 U/L (ref 0–32)
BILIRUBIN, TOTAL: 0.58 MG/DL (ref 0–1.2)
BUN/CREAT SERPL: 23.7 (ref 6–20)
CALCIUM SERPL-MCNC: 9.7 MG/DL (ref 8.6–10.2)
CARBON DIOXIDE, CO2: 29 MMOL/L (ref 22–29)
CHLORIDE: 103 MMOL/L (ref 98–107)
CREAT SERPL-MCNC: 0.57 MG/DL (ref 0.5–0.9)
FREE TESTOSTERONE: 0.13 NG/DL (ref 0–1)
GFR ESTIMATION: 120.01
GLOBULIN: 2.7 G/DL (ref 1.5–4.5)
GLUCOSE: 100 MG/DL (ref 74–106)
INSULIN AB SER QL: 16.2 UIU/ML (ref 2.6–24.9)
POTASSIUM: 4.6 MMOL/L (ref 3.5–5.1)
PROT SNV-MCNC: 7 G/DL (ref 6.4–8.3)
SODIUM: 141 MMOL/L (ref 136–145)
TESTOST SERPL-MCNC: 13.7 NG/DL
TSH SERPL DL<=0.005 MIU/L-ACNC: 1.85 UIU/ML (ref 0.27–4.2)
UREA NITROGEN (BUN): 13.5 MG/DL (ref 6–20)

## 2022-08-22 ENCOUNTER — PATIENT MESSAGE (OUTPATIENT)
Dept: OBSTETRICS AND GYNECOLOGY | Facility: CLINIC | Age: 22
End: 2022-08-22
Payer: COMMERCIAL

## 2022-08-31 ENCOUNTER — TELEPHONE (OUTPATIENT)
Dept: OBSTETRICS AND GYNECOLOGY | Facility: CLINIC | Age: 22
End: 2022-08-31
Payer: COMMERCIAL

## 2022-08-31 NOTE — TELEPHONE ENCOUNTER
----- Message from Analisa Ruiz sent at 8/31/2022 10:08 AM CDT -----  Verito (Dr. Crump's office) called to consult with nurse or staff regarding the patients recent lab results. She would like those results faxed to [phone]. If needing to reached their office they can be contacted at [phone]. Thanks/

## 2022-10-06 ENCOUNTER — TELEPHONE (OUTPATIENT)
Dept: NEUROLOGY | Facility: CLINIC | Age: 22
End: 2022-10-06
Payer: COMMERCIAL

## 2022-10-06 ENCOUNTER — TELEPHONE (OUTPATIENT)
Dept: NEUROLOGY | Facility: CLINIC | Age: 22
End: 2022-10-06

## 2022-10-06 NOTE — TELEPHONE ENCOUNTER
----- Message from Analisa Gold RN sent at 10/6/2022  8:40 AM CDT -----  Regarding: FW: Botox  Contact: Rafael @ [phone]    ----- Message -----  From: Addie Merlos  Sent: 10/6/2022   8:10 AM CDT  To: Ashlie Aaron Staff  Subject: Botox                                            Mom is calling to notify office that Botox was mailed to [place] and pt need Botox at home address in [place]. Mom stated that Nnamdio need a new prescription for Botox so that they can get medication to pt in [place]. Asking that office call Rafael

## 2022-10-11 ENCOUNTER — PROCEDURE VISIT (OUTPATIENT)
Dept: NEUROLOGY | Facility: CLINIC | Age: 22
End: 2022-10-11
Payer: COMMERCIAL

## 2022-10-11 DIAGNOSIS — G43.709 CHRONIC MIGRAINE WITHOUT AURA WITHOUT STATUS MIGRAINOSUS, NOT INTRACTABLE: Primary | ICD-10-CM

## 2022-10-11 PROCEDURE — 64615 CHEMODENERV MUSC MIGRAINE: CPT | Mod: S$GLB,,, | Performed by: PSYCHIATRY & NEUROLOGY

## 2022-10-11 PROCEDURE — 64615 PR CHEMODENERVATION OF MUSCLE FOR CHRONIC MIGRAINE: ICD-10-PCS | Mod: S$GLB,,, | Performed by: PSYCHIATRY & NEUROLOGY

## 2022-10-11 PROCEDURE — 99213 PR OFFICE/OUTPT VISIT, EST, LEVL III, 20-29 MIN: ICD-10-PCS | Mod: 25,S$GLB,, | Performed by: PSYCHIATRY & NEUROLOGY

## 2022-10-11 PROCEDURE — 99213 OFFICE O/P EST LOW 20 MIN: CPT | Mod: 25,S$GLB,, | Performed by: PSYCHIATRY & NEUROLOGY

## 2022-10-11 RX ORDER — ATOGEPANT 60 MG/1
60 TABLET ORAL DAILY
Qty: 30 TABLET | Refills: 12 | Status: SHIPPED | OUTPATIENT
Start: 2022-10-11 | End: 2022-10-11 | Stop reason: SDUPTHER

## 2022-10-11 RX ORDER — ATOGEPANT 60 MG/1
60 TABLET ORAL DAILY
Qty: 30 TABLET | Refills: 12 | Status: SHIPPED | OUTPATIENT
Start: 2022-10-11 | End: 2023-01-26
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
## 2022-10-24 ENCOUNTER — TELEPHONE (OUTPATIENT)
Dept: NEUROLOGY | Facility: CLINIC | Age: 22
End: 2022-10-24
Payer: COMMERCIAL

## 2022-10-24 DIAGNOSIS — G43.709 CHRONIC MIGRAINE WITHOUT AURA WITHOUT STATUS MIGRAINOSUS, NOT INTRACTABLE: ICD-10-CM

## 2022-10-24 NOTE — TELEPHONE ENCOUNTER
Aleksandra Menezes Garvey: [id number]     Drug  Qulipta 60MG tablets    Form  [employer] Electronic PA Form (2017 NCPDP)    An active PA is already on file with expiration date of 10/12/2023. Please wait to resubmit request within 60 days of that expiration date to obtain a PA renewal.

## 2022-10-28 ENCOUNTER — PATIENT MESSAGE (OUTPATIENT)
Dept: OBSTETRICS AND GYNECOLOGY | Facility: CLINIC | Age: 22
End: 2022-10-28
Payer: COMMERCIAL

## 2022-11-08 ENCOUNTER — LAB VISIT (OUTPATIENT)
Dept: LAB | Facility: OTHER | Age: 22
End: 2022-11-08
Attending: INTERNAL MEDICINE
Payer: COMMERCIAL

## 2022-11-08 DIAGNOSIS — E53.8 BIOTIN-(PROPIONYL-COA-CARBOXYLASE) LIGASE DEFICIENCY: ICD-10-CM

## 2022-11-08 DIAGNOSIS — E55.9 AVITAMINOSIS D: ICD-10-CM

## 2022-11-08 DIAGNOSIS — E16.1 IATROGENIC HYPERINSULINISM: ICD-10-CM

## 2022-11-08 DIAGNOSIS — R73.01 IMPAIRED FASTING GLUCOSE: ICD-10-CM

## 2022-11-08 DIAGNOSIS — N91.2 ABSENCE OF MENSTRUATION: Primary | ICD-10-CM

## 2022-11-08 DIAGNOSIS — R63.5 ABNORMAL WEIGHT GAIN: ICD-10-CM

## 2022-11-08 DIAGNOSIS — E03.9 PRIMARY HYPOTHYROIDISM: ICD-10-CM

## 2022-11-08 LAB
ALBUMIN SERPL BCP-MCNC: 3.8 G/DL (ref 3.5–5.2)
ALP SERPL-CCNC: 74 U/L (ref 55–135)
ALT SERPL W/O P-5'-P-CCNC: 10 U/L (ref 10–44)
ANION GAP SERPL CALC-SCNC: 7 MMOL/L (ref 8–16)
AST SERPL-CCNC: 17 U/L (ref 10–40)
BASOPHILS # BLD AUTO: 0.04 K/UL (ref 0–0.2)
BASOPHILS NFR BLD: 0.6 % (ref 0–1.9)
BILIRUB SERPL-MCNC: 1 MG/DL (ref 0.1–1)
BUN SERPL-MCNC: 10 MG/DL (ref 6–20)
CALCIUM SERPL-MCNC: 9.4 MG/DL (ref 8.7–10.5)
CHLORIDE SERPL-SCNC: 106 MMOL/L (ref 95–110)
CO2 SERPL-SCNC: 25 MMOL/L (ref 23–29)
CREAT SERPL-MCNC: 0.7 MG/DL (ref 0.5–1.4)
DIFFERENTIAL METHOD: NORMAL
EOSINOPHIL # BLD AUTO: 0.1 K/UL (ref 0–0.5)
EOSINOPHIL NFR BLD: 1.3 % (ref 0–8)
ERYTHROCYTE [DISTWIDTH] IN BLOOD BY AUTOMATED COUNT: 11.6 % (ref 11.5–14.5)
EST. GFR  (NO RACE VARIABLE): >60 ML/MIN/1.73 M^2
GLUCOSE SERPL-MCNC: 87 MG/DL (ref 70–110)
HCT VFR BLD AUTO: 41.7 % (ref 37–48.5)
HGB BLD-MCNC: 14.3 G/DL (ref 12–16)
IMM GRANULOCYTES # BLD AUTO: 0.01 K/UL (ref 0–0.04)
IMM GRANULOCYTES NFR BLD AUTO: 0.1 % (ref 0–0.5)
IRON SERPL-MCNC: 162 UG/DL (ref 30–160)
LYMPHOCYTES # BLD AUTO: 3 K/UL (ref 1–4.8)
LYMPHOCYTES NFR BLD: 43.5 % (ref 18–48)
MCH RBC QN AUTO: 30.9 PG (ref 27–31)
MCHC RBC AUTO-ENTMCNC: 34.3 G/DL (ref 32–36)
MCV RBC AUTO: 90 FL (ref 82–98)
MONOCYTES # BLD AUTO: 0.6 K/UL (ref 0.3–1)
MONOCYTES NFR BLD: 8.3 % (ref 4–15)
NEUTROPHILS # BLD AUTO: 3.2 K/UL (ref 1.8–7.7)
NEUTROPHILS NFR BLD: 46.2 % (ref 38–73)
NRBC BLD-RTO: 0 /100 WBC
PLATELET # BLD AUTO: 269 K/UL (ref 150–450)
PMV BLD AUTO: 10 FL (ref 9.2–12.9)
POTASSIUM SERPL-SCNC: 4.4 MMOL/L (ref 3.5–5.1)
PROT SERPL-MCNC: 7.2 G/DL (ref 6–8.4)
RBC # BLD AUTO: 4.63 M/UL (ref 4–5.4)
SODIUM SERPL-SCNC: 138 MMOL/L (ref 136–145)
T4 FREE SERPL-MCNC: 0.98 NG/DL (ref 0.71–1.51)
TSH SERPL DL<=0.005 MIU/L-ACNC: 1.55 UIU/ML (ref 0.4–4)
WBC # BLD AUTO: 6.83 K/UL (ref 3.9–12.7)

## 2022-11-08 PROCEDURE — 84439 ASSAY OF FREE THYROXINE: CPT | Performed by: INTERNAL MEDICINE

## 2022-11-08 PROCEDURE — 80053 COMPREHEN METABOLIC PANEL: CPT | Performed by: INTERNAL MEDICINE

## 2022-11-08 PROCEDURE — 36415 COLL VENOUS BLD VENIPUNCTURE: CPT | Performed by: INTERNAL MEDICINE

## 2022-11-08 PROCEDURE — 83540 ASSAY OF IRON: CPT | Performed by: INTERNAL MEDICINE

## 2022-11-08 PROCEDURE — 82607 VITAMIN B-12: CPT | Performed by: INTERNAL MEDICINE

## 2022-11-08 PROCEDURE — 82746 ASSAY OF FOLIC ACID SERUM: CPT | Performed by: INTERNAL MEDICINE

## 2022-11-08 PROCEDURE — 85025 COMPLETE CBC W/AUTO DIFF WBC: CPT | Performed by: INTERNAL MEDICINE

## 2022-11-08 PROCEDURE — 84443 ASSAY THYROID STIM HORMONE: CPT | Performed by: INTERNAL MEDICINE

## 2022-11-09 LAB
FOLATE SERPL-MCNC: 8.7 NG/ML (ref 4–24)
VIT B12 SERPL-MCNC: 337 PG/ML (ref 210–950)

## 2023-01-26 ENCOUNTER — OFFICE VISIT (OUTPATIENT)
Dept: NEUROLOGY | Facility: CLINIC | Age: 23
End: 2023-01-26
Payer: COMMERCIAL

## 2023-01-26 VITALS — WEIGHT: 152.56 LBS | BODY MASS INDEX: 28.8 KG/M2 | HEIGHT: 61 IN

## 2023-01-26 DIAGNOSIS — G43.709 CHRONIC MIGRAINE WITHOUT AURA WITHOUT STATUS MIGRAINOSUS, NOT INTRACTABLE: Primary | ICD-10-CM

## 2023-01-26 DIAGNOSIS — G43.001 MIGRAINE WITHOUT AURA AND WITH STATUS MIGRAINOSUS, NOT INTRACTABLE: ICD-10-CM

## 2023-01-26 PROCEDURE — 99999 PR PBB SHADOW E&M-EST. PATIENT-LVL III: ICD-10-PCS | Mod: PBBFAC,,, | Performed by: PSYCHIATRY & NEUROLOGY

## 2023-01-26 PROCEDURE — 1160F RVW MEDS BY RX/DR IN RCRD: CPT | Mod: CPTII,S$GLB,, | Performed by: PSYCHIATRY & NEUROLOGY

## 2023-01-26 PROCEDURE — 1159F MED LIST DOCD IN RCRD: CPT | Mod: CPTII,S$GLB,, | Performed by: PSYCHIATRY & NEUROLOGY

## 2023-01-26 PROCEDURE — 1159F PR MEDICATION LIST DOCUMENTED IN MEDICAL RECORD: ICD-10-PCS | Mod: CPTII,S$GLB,, | Performed by: PSYCHIATRY & NEUROLOGY

## 2023-01-26 PROCEDURE — 99499 NO LOS: ICD-10-PCS | Mod: S$GLB,,, | Performed by: PSYCHIATRY & NEUROLOGY

## 2023-01-26 PROCEDURE — 99499 UNLISTED E&M SERVICE: CPT | Mod: S$GLB,,, | Performed by: PSYCHIATRY & NEUROLOGY

## 2023-01-26 PROCEDURE — 1160F PR REVIEW ALL MEDS BY PRESCRIBER/CLIN PHARMACIST DOCUMENTED: ICD-10-PCS | Mod: CPTII,S$GLB,, | Performed by: PSYCHIATRY & NEUROLOGY

## 2023-01-26 PROCEDURE — 99999 PR PBB SHADOW E&M-EST. PATIENT-LVL III: CPT | Mod: PBBFAC,,, | Performed by: PSYCHIATRY & NEUROLOGY

## 2023-01-26 PROCEDURE — 64405 PR NERVE BLOCK INJ, ANES/STEROID, OCCIPITAL: ICD-10-PCS | Mod: 50,S$GLB,, | Performed by: PSYCHIATRY & NEUROLOGY

## 2023-01-26 PROCEDURE — 3008F PR BODY MASS INDEX (BMI) DOCUMENTED: ICD-10-PCS | Mod: CPTII,S$GLB,, | Performed by: PSYCHIATRY & NEUROLOGY

## 2023-01-26 PROCEDURE — 64405 NJX AA&/STRD GR OCPL NRV: CPT | Mod: 50,S$GLB,, | Performed by: PSYCHIATRY & NEUROLOGY

## 2023-01-26 PROCEDURE — 3008F BODY MASS INDEX DOCD: CPT | Mod: CPTII,S$GLB,, | Performed by: PSYCHIATRY & NEUROLOGY

## 2023-01-26 RX ORDER — METHYLPREDNISOLONE ACETATE 40 MG/ML
40 INJECTION, SUSPENSION INTRA-ARTICULAR; INTRALESIONAL; INTRAMUSCULAR; SOFT TISSUE
Status: COMPLETED | OUTPATIENT
Start: 2023-01-26 | End: 2023-01-26

## 2023-01-26 RX ORDER — VORTIOXETINE 10 MG/1
10 TABLET, FILM COATED ORAL DAILY
Qty: 30 TABLET | Refills: 12 | Status: SHIPPED | OUTPATIENT
Start: 2023-01-26

## 2023-01-26 RX ORDER — GALCANEZUMAB 120 MG/ML
120 INJECTION, SOLUTION SUBCUTANEOUS
Qty: 1 ML | Refills: 11 | Status: SHIPPED | OUTPATIENT
Start: 2023-01-26 | End: 2024-01-29 | Stop reason: SDUPTHER

## 2023-01-26 RX ADMIN — METHYLPREDNISOLONE ACETATE 40 MG: 40 INJECTION, SUSPENSION INTRA-ARTICULAR; INTRALESIONAL; INTRAMUSCULAR; SOFT TISSUE at 02:01

## 2023-01-26 NOTE — PROGRESS NOTES
"Follow up:     Reports daily occipital HA x 4 weeks   Unknown trigger   Some use of alleve   No ER or UC   Sleep - good   Appetite - good       Prior note:   Still has chr migraine daily   Started Reglan 10 mg PRN        NO notes:  2016 fell off a kasia, fell 40 feet and hit her head and back on the way down. Had loss of consciousness for several seconds. Not sure if she had post concussive syndrome.      Neurologist in [place] has been treating headaches up til this point. Has had them since childhood, but they have gotten worse. Got prism glasses for reading from ophthalmologist in [place] in 9/2020 but they have not helped.      Mom with headaches.      10/1/2021 Nicks:    "Patient reports dull headaches through   out the entire day, with episodes of intense headaches mixed inbetween.   During the episodes, pt reports vertical double vision. Pt has to go to   sleep to make headache and diplopia go away. Pt states that her PCP   recommended she take 4 Tylenols but they do not help. Pt states that she   occasionally becomes nauseous with blurred vision during the intense   episodes. Pt states eye pain that comes and go. She also reports nausea,   face numbness, and neck burning during the episodes. Overall no medication   has helped. Patient was in the ER 09/18 with the same complaints. MRI was   performed which was WNL. She reports no changes in vision or symptoms   since the MRI was performed.      Patient recently had an eye exam with [facility] in [place], [place].   She was given prism glasses to use for reading and occasionally for night   driving. Patient reports that the eye doctor told her she had an eye   muscle problem. She does not like the glasses because her vision is   blurred through them. If she takes the glasses off, her vision is clear.   The glasses also give her a headache/strained eyes. "  ?  Neuro-ophthalmologic examination was notable for excellent visual acuities, full color " vision, good convergence, moderate XT at near, fair convergence amplitude. I suspect she is less able to converge during severe headaches. I recommended using her prism glasses for reading only when symptomatic     studying musical theater, sophomore      Headache history:   Age of onset -  childhood   Location - occipital, behind eyes   Nature of pain -  Throbbing   Prodrome - no   Aura -  No   Duration of headache - > 4 hrs   Time to peak intensity - hrs   Pain scale - range of intensity -  9/10  Frequency -  Daily since April   Status Migrainosus history - yes  Time of day of most headaches- anytime      Associated symptoms with the headache:   Meningeal symptoms - photophobia, phonophobia, exercise intolerance +   Nausea/vomitting +   Neck pain +      Cluster headache symptoms: none      Symptoms of increased intracranial pressure:   Whooshing sounds   Visual spots/blotches      Basilar migraine symptoms:none      Headache Triggers:  unkown      Other comorbid conditions:  Anxiety - yes   Motion sickness symptom - no      Treatment history:  Resolution of headache with sleep - yes   Meds tried:  Failed topamax, elavil, cymbalta, lexapro    On OCP monjhly - no change   Ubrelvy - not help   AIMOVIG x 2 - not help   relpax - not help   Propranolol  -n/v   Emgality - mild imprvmnt   BOTOX #1 1/14/22 - helped    BOTOX #2 2/14/22 - helped   BOTOX #3 in July in [place]   Qulipta 60 mg - vomiting     Care Timeline     1649    ketorolac tromethamine 15 mg       metoclopramide HCl 10 mg   1650    0.9 % sodium chloride 1000 mL   1705    Comprehensive metabolic panel        HIV 1/2 Ag/Ab (4th Gen)       Hepatitis C Antibody       CBC auto differential   1706    POCT urine pregnancy   1712    ISTAT PROCEDURE    1802    MRI Brain W WO Contrast   1803    gadobutrol 6 mL   1848    potassium bicarbonate/cit ac 50 mEq   1933    Discharged         Headache risk factors:   H/o TBI  - 2010 [facility] with concussion, 2015 fell off kasia   H/o  Meningitis  - no   F/h Aneurysms  - no     Review of Systems   Constitutional: Negative.    HENT: Negative.    Eyes: Negative.    Respiratory: Negative.    Cardiovascular: Negative.    Gastrointestinal: Negative.    Endocrine: Negative.    Genitourinary: Negative.    Musculoskeletal: Negative.    Integumentary:  Negative.   Allergic/Immunologic: Negative.    Neurological: Positive for headaches.   Hematological: Negative.    Psychiatric/Behavioral: Positive for sleep disturbance.          Objective:   Physical Exam  Constitutional:       Appearance: Normal appearance.      Psychiatric:         Mood and Affect: Mood normal.         Behavior: Behavior normal.         Thought Content: Thought content normal.         Judgment: Judgment normal.          Headache Exam:  Optic disc is flat OU   Temples appear symmetric  No V1,V2,V3 distribution allodynia/hyperalgesia adrien   No facial swelling  No gross TMJ abnormalities   No ptosis   No conj injection   No meningismus   No neck stiffness  No C spine tenderness   Cervical facet tenderness on palpation adrien    tender points over trapezium   adrien  supraorbital nerve exit point tenderness  adrien  occipital nerve exit point tenderness  No auriculotemporal nerve tenderness      Limited ROM neck   Assessment:   1, Migraine - chronic        MR Impression:   No acute intracranial abnormality.  No findings to suggest dural venous sinus thrombosis.     Electronically signed by resident: Betty Ruiz  Date:                                            09/18/2021  Time:                                           18:03     Electronically signed by: John Blake MD  Date:                                            09/18/2021  Time:                                           18:24        MRI FINDINGS:  Alignment: Straightening of the normal cervical lordosis.     Vertebrae: No diffuse marrow signal abnormality to suggest infiltrative process.  No evidence of acute fractures.  Vertebral body  heights are well maintained.     Discs: Disc heights are well maintained with normal disc signal.     Cord: Normal contour and signal.     Skull base and craniocervical junction: Normal.     Degenerative findings:   C2-C3: No significant spinal canal stenosis or neural foraminal narrowing.   C3-C4: No significant spinal canal stenosis or neural foraminal narrowing.   C4-C5: No significant spinal canal stenosis or neural foraminal narrowing.   C5-C6: No significant spinal canal stenosis or neural foraminal narrowing.   C6-C7: No significant spinal canal stenosis or neural foraminal narrowing.   C7-T1: No significant spinal canal stenosis or neural foraminal narrowing.   T1-T2: No significant spinal canal stenosis or neural foraminal narrowing.   Paraspinal muscles & soft tissues: Normal.     Impression:   No significant abnormality identified.     Electronically signed by resident: Matthew Preston  Date:                                            10/23/2021  Time:                                           13:03     Electronically signed by: Humphrey Jordan  Date:                                            10/23/2021  Time:                                           14:05     Results for LALI HAYNES (MRN [id number]) as of 12/16/2021 16:19    Ref. Range 10/14/2021 14:02   Ferritin Latest Ref Range: 20.0 - 300.0 ng/mL 92   Vitamin B-12 Latest Ref Range: 210 - 950 pg/mL 414   Sed Rate Latest Ref Range: 0 - 36 mm/Hr 8   CRP Latest Ref Range: 0.0 - 8.2 mg/L 3.9   Thiamine Latest Ref Range: 38 - 122 ug/L 48   Vitamin B2 Latest Ref Range: 1 - 19 mcg/L 5   Vitamin B6 Latest Ref Range: 5 - 50 ug/L 9   TSH Latest Ref Range: 0.400 - 4.000 uIU/mL 0.969   Lead, Blood Latest Ref Range: <5.0 mcg/dL <1.0   LALI Screen Latest Ref Range: Negative <1:80  Positive (A)   LALI Titer 1 Unknown 1:80   LALI PATTERN 1 Unknown Homogeneous   LALI Titer 2 Unknown 1:80   LALI Pattern 2 Unknown Speckled   ds DNA Ab Latest Ref Range: Negative 1:10   Negative 1:10   Anti-SSA Antibody Latest Ref Range: 0.00 - 0.99 Ratio 0.06   Anti-SSA Interpretation Latest Ref Range: Negative  Negative   Anti-SSB Antibody Latest Ref Range: 0.00 - 0.99 Ratio 0.19   Anti-SSB Interpretation Latest Ref Range: Negative  Negative   Anti Sm Antibody Latest Ref Range: 0.00 - 0.99 Ratio 0.09   Anti-Sm Interpretation Latest Ref Range: Negative  Negative   Anti Sm/RNP Antibody Latest Ref Range: 0.00 - 0.99 Ratio 0.09   Anti-Sm/RNP Interpretation Latest Ref Range: Negative  Negative   Antigliadin Ab IgG Latest Ref Range: <20 UNITS 2   Antigliadin Abs, IgA Latest Ref Range: <20 UNITS 3   TTG IgA Latest Ref Range: <20 UNITS 5   TTG IgG Latest Ref Range: <20 UNITS 3   Immunoglobulin A (IgA) Latest Ref Range: 70 - 400 mg/dL 117   Rheumatoid Factor Latest Ref Range: 0.0 - 15.0 IU/mL <13.0   Venous/Capillary Unknown venous      Plan:   1. On metformin  Cont BOTOX   Emgality (Nov 2021-)   Start trintellix 10 mg daily, discussed side effects   2, Breakthrough headache - tylenol, reyvow   Multiple alternative treatment options were offered to the patient  3. Occipital neuralgia - If medical management is ineffective may consider occipital nerve blocks.   4. I urged the patient to keep a headache calender.    Procedures    Occipital Nerve block:  After obtaining informed consent, the patient was positioned in seated position with neck flexed.     Site: Bilateral     Pharmacological agent:   40 mg Methylprednisolone + 0.5% Sensorcaine     The patients occipital region and upper neck was prepped in the usual sterile fashion. Area of maximal tenderness was identified by palpation. A 22 gauge, 1.5 inch needle was introduced under the skin down to the periosteum just <1cm lateral to the occipital artery. After aspiration was negative or blood approximately 5  cc of pharmacological agent was injected. The patient tolerated the procedure well with no adverse events. The patient was able to get up and walk   under their own power.  No complications were noted.    I recommend the patient take Tylenol and/or try ice pack tonight to help with the post procedure soreness.   If the patient notices any new neurological symptoms I urged the patient to seek immediate medical care at local ER. The patient verbalized understanding.

## 2023-01-30 ENCOUNTER — TELEPHONE (OUTPATIENT)
Dept: PHARMACY | Facility: CLINIC | Age: 23
End: 2023-01-30
Payer: COMMERCIAL

## 2023-02-27 ENCOUNTER — TELEPHONE (OUTPATIENT)
Dept: NEUROLOGY | Facility: CLINIC | Age: 23
End: 2023-02-27
Payer: COMMERCIAL

## 2023-02-27 DIAGNOSIS — G43.709 CHRONIC MIGRAINE WITHOUT AURA WITHOUT STATUS MIGRAINOSUS, NOT INTRACTABLE: Primary | ICD-10-CM

## 2023-03-09 ENCOUNTER — PROCEDURE VISIT (OUTPATIENT)
Dept: NEUROLOGY | Facility: CLINIC | Age: 23
End: 2023-03-09
Payer: COMMERCIAL

## 2023-03-09 VITALS
SYSTOLIC BLOOD PRESSURE: 107 MMHG | HEART RATE: 87 BPM | HEIGHT: 61 IN | BODY MASS INDEX: 29.41 KG/M2 | DIASTOLIC BLOOD PRESSURE: 68 MMHG | WEIGHT: 155.75 LBS

## 2023-03-09 DIAGNOSIS — G43.709 CHRONIC MIGRAINE WITHOUT AURA WITHOUT STATUS MIGRAINOSUS, NOT INTRACTABLE: Primary | ICD-10-CM

## 2023-03-09 PROCEDURE — 64615 PR CHEMODENERVATION OF MUSCLE FOR CHRONIC MIGRAINE: ICD-10-PCS | Mod: S$GLB,,, | Performed by: PSYCHIATRY & NEUROLOGY

## 2023-03-09 PROCEDURE — 64615 CHEMODENERV MUSC MIGRAINE: CPT | Mod: S$GLB,,, | Performed by: PSYCHIATRY & NEUROLOGY

## 2023-03-09 NOTE — LETTER
March 9, 2023      [facility] 7th [address]  [address], [address]  [facility] [address]  Phone: [phone]  Fax: [phone]       Patient: Sheba Menezes   YOB: 2000  Date of Visit: 03/09/2023    To Whom It May Concern:    Sheba Menezes  was at [facility] on 03/09/2023. The patient may return to work/school on 03/10/2023 with no restrictions. If you have any questions or concerns, or if I can be of further assistance, please do not hesitate to contact me.    Sincerely,    Radha Rhoades MA

## 2023-03-15 ENCOUNTER — PATIENT MESSAGE (OUTPATIENT)
Dept: OBSTETRICS AND GYNECOLOGY | Facility: CLINIC | Age: 23
End: 2023-03-15
Payer: COMMERCIAL

## 2023-04-20 ENCOUNTER — TELEPHONE (OUTPATIENT)
Dept: PHARMACY | Facility: CLINIC | Age: 23
End: 2023-04-20
Payer: COMMERCIAL

## 2023-04-24 NOTE — PROCEDURES
"Follow up:     Reports daily occipital HA x 4 weeks   Unknown trigger   Some use of alleve   No ER or UC   Sleep - good   Appetite - good       Prior note:   Still has chr migraine daily   Started Reglan 10 mg PRN        NO notes:  2016 fell off a kasia, fell 40 feet and hit her head and back on the way down. Had loss of consciousness for several seconds. Not sure if she had post concussive syndrome.      Neurologist in [place] has been treating headaches up til this point. Has had them since childhood, but they have gotten worse. Got prism glasses for reading from ophthalmologist in [place] in 9/2020 but they have not helped.      Mom with headaches.      10/1/2021 Nicks:    "Patient reports dull headaches through   out the entire day, with episodes of intense headaches mixed inbetween.   During the episodes, pt reports vertical double vision. Pt has to go to   sleep to make headache and diplopia go away. Pt states that her PCP   recommended she take 4 Tylenols but they do not help. Pt states that she   occasionally becomes nauseous with blurred vision during the intense   episodes. Pt states eye pain that comes and go. She also reports nausea,   face numbness, and neck burning during the episodes. Overall no medication   has helped. Patient was in the ER 09/18 with the same complaints. MRI was   performed which was WNL. She reports no changes in vision or symptoms   since the MRI was performed.      Patient recently had an eye exam with [facility] in [place], [place].   She was given prism glasses to use for reading and occasionally for night   driving. Patient reports that the eye doctor told her she had an eye   muscle problem. She does not like the glasses because her vision is   blurred through them. If she takes the glasses off, her vision is clear.   The glasses also give her a headache/strained eyes. "  ?  Neuro-ophthalmologic examination was notable for excellent visual acuities, full color " vision, good convergence, moderate XT at near, fair convergence amplitude. I suspect she is less able to converge during severe headaches. I recommended using her prism glasses for reading only when symptomatic     studying musical theater, sophomore      Headache history:   Age of onset -  childhood   Location - occipital, behind eyes   Nature of pain -  Throbbing   Prodrome - no   Aura -  No   Duration of headache - > 4 hrs   Time to peak intensity - hrs   Pain scale - range of intensity -  9/10  Frequency -  Daily since April   Status Migrainosus history - yes  Time of day of most headaches- anytime      Associated symptoms with the headache:   Meningeal symptoms - photophobia, phonophobia, exercise intolerance +   Nausea/vomitting +   Neck pain +      Cluster headache symptoms: none      Symptoms of increased intracranial pressure:   Whooshing sounds   Visual spots/blotches      Basilar migraine symptoms:none      Headache Triggers:  unkown      Other comorbid conditions:  Anxiety - yes   Motion sickness symptom - no      Treatment history:  Resolution of headache with sleep - yes   Meds tried:  Failed topamax, elavil, cymbalta, lexapro    On OCP monjhly - no change   Ubrelvy - not help   AIMOVIG x 2 - not help   relpax - not help   Propranolol  -n/v   Emgality - mild imprvmnt   BOTOX #1 1/14/22 - helped    BOTOX #2 2/14/22 - helped   BOTOX #3 in July in [place]   BOTOX #4 1/26/23 - helped    Qulipta 60 mg - vomiting     Care Timeline     1649    ketorolac tromethamine 15 mg       metoclopramide HCl 10 mg   1650    0.9 % sodium chloride 1000 mL   1705    Comprehensive metabolic panel        HIV 1/2 Ag/Ab (4th Gen)       Hepatitis C Antibody       CBC auto differential   1706    POCT urine pregnancy   1712    ISTAT PROCEDURE    1802    MRI Brain W WO Contrast   1803    gadobutrol 6 mL   1848    potassium bicarbonate/cit ac 50 mEq   1933    Discharged         Headache risk factors:   H/o TBI  - 2010 [facility] with  concussion, 2015 fell off kasia   H/o Meningitis  - no   F/h Aneurysms  - no     Review of Systems   Constitutional: Negative.    HENT: Negative.    Eyes: Negative.    Respiratory: Negative.    Cardiovascular: Negative.    Gastrointestinal: Negative.    Endocrine: Negative.    Genitourinary: Negative.    Musculoskeletal: Negative.    Integumentary:  Negative.   Allergic/Immunologic: Negative.    Neurological: Positive for headaches.   Hematological: Negative.    Psychiatric/Behavioral: Positive for sleep disturbance.          Objective:   Physical Exam  Constitutional:       Appearance: Normal appearance.      Psychiatric:         Mood and Affect: Mood normal.         Behavior: Behavior normal.         Thought Content: Thought content normal.         Judgment: Judgment normal.          Headache Exam:  Optic disc is flat OU   Temples appear symmetric  No V1,V2,V3 distribution allodynia/hyperalgesia adrien   No facial swelling  No gross TMJ abnormalities   No ptosis   No conj injection   No meningismus   No neck stiffness  No C spine tenderness   Cervical facet tenderness on palpation adrien    tender points over trapezium   adrien  supraorbital nerve exit point tenderness  adrien  occipital nerve exit point tenderness  No auriculotemporal nerve tenderness      Limited ROM neck   Assessment:   1, Migraine - chronic        MR Impression:   No acute intracranial abnormality.  No findings to suggest dural venous sinus thrombosis.     Electronically signed by resident: Betty Ruiz  Date:                                            09/18/2021  Time:                                           18:03     Electronically signed by: John Blake MD  Date:                                            09/18/2021  Time:                                           18:24        MRI FINDINGS:  Alignment: Straightening of the normal cervical lordosis.     Vertebrae: No diffuse marrow signal abnormality to suggest infiltrative process.  No evidence of  acute fractures.  Vertebral body heights are well maintained.     Discs: Disc heights are well maintained with normal disc signal.     Cord: Normal contour and signal.     Skull base and craniocervical junction: Normal.     Degenerative findings:   C2-C3: No significant spinal canal stenosis or neural foraminal narrowing.   C3-C4: No significant spinal canal stenosis or neural foraminal narrowing.   C4-C5: No significant spinal canal stenosis or neural foraminal narrowing.   C5-C6: No significant spinal canal stenosis or neural foraminal narrowing.   C6-C7: No significant spinal canal stenosis or neural foraminal narrowing.   C7-T1: No significant spinal canal stenosis or neural foraminal narrowing.   T1-T2: No significant spinal canal stenosis or neural foraminal narrowing.   Paraspinal muscles & soft tissues: Normal.     Impression:   No significant abnormality identified.     Electronically signed by resident: Matthew Preston  Date:                                            10/23/2021  Time:                                           13:03     Electronically signed by: Humphrey Jordan  Date:                                            10/23/2021  Time:                                           14:05     Results for LALI HAYNES (MRN [id number]) as of 12/16/2021 16:19    Ref. Range 10/14/2021 14:02   Ferritin Latest Ref Range: 20.0 - 300.0 ng/mL 92   Vitamin B-12 Latest Ref Range: 210 - 950 pg/mL 414   Sed Rate Latest Ref Range: 0 - 36 mm/Hr 8   CRP Latest Ref Range: 0.0 - 8.2 mg/L 3.9   Thiamine Latest Ref Range: 38 - 122 ug/L 48   Vitamin B2 Latest Ref Range: 1 - 19 mcg/L 5   Vitamin B6 Latest Ref Range: 5 - 50 ug/L 9   TSH Latest Ref Range: 0.400 - 4.000 uIU/mL 0.969   Lead, Blood Latest Ref Range: <5.0 mcg/dL <1.0   LALI Screen Latest Ref Range: Negative <1:80  Positive (A)   LALI Titer 1 Unknown 1:80   LALI PATTERN 1 Unknown Homogeneous   LALI Titer 2 Unknown 1:80   LALI Pattern 2 Unknown Speckled   ds DNA Ab  Latest Ref Range: Negative 1:10  Negative 1:10   Anti-SSA Antibody Latest Ref Range: 0.00 - 0.99 Ratio 0.06   Anti-SSA Interpretation Latest Ref Range: Negative  Negative   Anti-SSB Antibody Latest Ref Range: 0.00 - 0.99 Ratio 0.19   Anti-SSB Interpretation Latest Ref Range: Negative  Negative   Anti Sm Antibody Latest Ref Range: 0.00 - 0.99 Ratio 0.09   Anti-Sm Interpretation Latest Ref Range: Negative  Negative   Anti Sm/RNP Antibody Latest Ref Range: 0.00 - 0.99 Ratio 0.09   Anti-Sm/RNP Interpretation Latest Ref Range: Negative  Negative   Antigliadin Ab IgG Latest Ref Range: <20 UNITS 2   Antigliadin Abs, IgA Latest Ref Range: <20 UNITS 3   TTG IgA Latest Ref Range: <20 UNITS 5   TTG IgG Latest Ref Range: <20 UNITS 3   Immunoglobulin A (IgA) Latest Ref Range: 70 - 400 mg/dL 117   Rheumatoid Factor Latest Ref Range: 0.0 - 15.0 IU/mL <13.0   Venous/Capillary Unknown venous      Plan:   1. On metformin  Cont BOTOX   Emgality (Nov 2021-)   Start trintellix 10 mg daily, discussed side effects   2, Breakthrough headache - tylenol, reyvow   Multiple alternative treatment options were offered to the patient  3. Occipital neuralgia - If medical management is ineffective may consider occipital nerve blocks.   4. I urged the patient to keep a headache calender.    Procedures  BOTOX was performed as an indicated therapy for intractable chronic migraine headaches given that the patient failed > 3  prophylactic medications    Botulinum Toxin Injection Procedure   Pre-operative diagnosis: Chronic migraine   Post-operative diagnosis: Same   Procedure: Chemical neurolysis   After risks and benefits were explained including bleeding, infection, worsening of pain, damage to the areas being injected, weakness of muscles, loss of muscle control, dysphagia if injecting the head or neck, facial droop if injecting the facial area, painful injection, allergic or other reaction to the medications being injected, and the failure of the  procedure to help the problem, a signed consent was obtained.   The patient was placed in a comfortable area and the sites to be treated were identified.The area to be treated was prepped three times with alcohol and the alcohol allowed to dry. Next, a 30 gauge needle was used to inject the medication in the area to be treated.   Area(s) injected:   Total Botox used: 155 Units   Botox wastage: 45 Units   Injection sites:    muscle bilaterally ( a total of 10 units divided into 2 sites)   Procerus muscle (5 units)   Frontalis muscle bilaterally (a total of 20 units divided into 4 sites)   Temporalis muscle bilaterally (a total of 40 units divided into 8 sites)   Occipitalis muscle bilaterally (a total of 30 units divided into 6 sites)   Cervical paraspinal muscles (a total of 20 units divided into 4 sites)   Trapezius muscle bilaterally (a total of 30 units divided into 6 sites)   Complications: none   RTC for the next Botox injection: 3 months

## 2023-04-29 ENCOUNTER — OFFICE VISIT (OUTPATIENT)
Dept: OBSTETRICS AND GYNECOLOGY | Facility: CLINIC | Age: 23
End: 2023-04-29
Payer: COMMERCIAL

## 2023-04-29 VITALS
DIASTOLIC BLOOD PRESSURE: 64 MMHG | HEIGHT: 61 IN | SYSTOLIC BLOOD PRESSURE: 106 MMHG | WEIGHT: 147.69 LBS | BODY MASS INDEX: 27.88 KG/M2

## 2023-04-29 DIAGNOSIS — Z78.9 DATE OF LAST MENSTRUAL PERIOD (LMP) UNKNOWN: ICD-10-CM

## 2023-04-29 DIAGNOSIS — N76.2 ACUTE VULVITIS: Primary | ICD-10-CM

## 2023-04-29 LAB
B-HCG UR QL: NEGATIVE
BILIRUB UR QL STRIP: NEGATIVE
CANDIDA RRNA VAG QL PROBE: NEGATIVE
CLARITY UR REFRACT.AUTO: CLEAR
COLOR UR AUTO: YELLOW
CTP QC/QA: YES
G VAGINALIS RRNA GENITAL QL PROBE: POSITIVE
GLUCOSE UR QL STRIP: NEGATIVE
HGB UR QL STRIP: NEGATIVE
KETONES UR QL STRIP: NEGATIVE
LEUKOCYTE ESTERASE UR QL STRIP: NEGATIVE
NITRITE UR QL STRIP: NEGATIVE
PH UR STRIP: 7 [PH] (ref 5–8)
PROT UR QL STRIP: NEGATIVE
SP GR UR STRIP: 1.01 (ref 1–1.03)
T VAGINALIS RRNA GENITAL QL PROBE: NEGATIVE
URN SPEC COLLECT METH UR: NORMAL

## 2023-04-29 PROCEDURE — 87510 GARDNER VAG DNA DIR PROBE: CPT | Performed by: ADVANCED PRACTICE MIDWIFE

## 2023-04-29 PROCEDURE — 3078F PR MOST RECENT DIASTOLIC BLOOD PRESSURE < 80 MM HG: ICD-10-PCS | Mod: CPTII,S$GLB,, | Performed by: ADVANCED PRACTICE MIDWIFE

## 2023-04-29 PROCEDURE — 99999 PR PBB SHADOW E&M-EST. PATIENT-LVL III: CPT | Mod: PBBFAC,,, | Performed by: ADVANCED PRACTICE MIDWIFE

## 2023-04-29 PROCEDURE — 99212 PR OFFICE/OUTPT VISIT, EST, LEVL II, 10-19 MIN: ICD-10-PCS | Mod: 25,S$GLB,, | Performed by: ADVANCED PRACTICE MIDWIFE

## 2023-04-29 PROCEDURE — 3008F BODY MASS INDEX DOCD: CPT | Mod: CPTII,S$GLB,, | Performed by: ADVANCED PRACTICE MIDWIFE

## 2023-04-29 PROCEDURE — 3078F DIAST BP <80 MM HG: CPT | Mod: CPTII,S$GLB,, | Performed by: ADVANCED PRACTICE MIDWIFE

## 2023-04-29 PROCEDURE — 1159F MED LIST DOCD IN RCRD: CPT | Mod: CPTII,S$GLB,, | Performed by: ADVANCED PRACTICE MIDWIFE

## 2023-04-29 PROCEDURE — 1159F PR MEDICATION LIST DOCUMENTED IN MEDICAL RECORD: ICD-10-PCS | Mod: CPTII,S$GLB,, | Performed by: ADVANCED PRACTICE MIDWIFE

## 2023-04-29 PROCEDURE — 81025 POCT URINE PREGNANCY: ICD-10-PCS | Mod: S$GLB,,, | Performed by: ADVANCED PRACTICE MIDWIFE

## 2023-04-29 PROCEDURE — 81025 URINE PREGNANCY TEST: CPT | Mod: S$GLB,,, | Performed by: ADVANCED PRACTICE MIDWIFE

## 2023-04-29 PROCEDURE — 99212 OFFICE O/P EST SF 10 MIN: CPT | Mod: 25,S$GLB,, | Performed by: ADVANCED PRACTICE MIDWIFE

## 2023-04-29 PROCEDURE — 87480 CANDIDA DNA DIR PROBE: CPT | Performed by: ADVANCED PRACTICE MIDWIFE

## 2023-04-29 PROCEDURE — 3008F PR BODY MASS INDEX (BMI) DOCUMENTED: ICD-10-PCS | Mod: CPTII,S$GLB,, | Performed by: ADVANCED PRACTICE MIDWIFE

## 2023-04-29 PROCEDURE — 3074F PR MOST RECENT SYSTOLIC BLOOD PRESSURE < 130 MM HG: ICD-10-PCS | Mod: CPTII,S$GLB,, | Performed by: ADVANCED PRACTICE MIDWIFE

## 2023-04-29 PROCEDURE — 3074F SYST BP LT 130 MM HG: CPT | Mod: CPTII,S$GLB,, | Performed by: ADVANCED PRACTICE MIDWIFE

## 2023-04-29 PROCEDURE — 99999 PR PBB SHADOW E&M-EST. PATIENT-LVL III: ICD-10-PCS | Mod: PBBFAC,,, | Performed by: ADVANCED PRACTICE MIDWIFE

## 2023-04-29 PROCEDURE — 81003 URINALYSIS AUTO W/O SCOPE: CPT | Performed by: ADVANCED PRACTICE MIDWIFE

## 2023-04-29 RX ORDER — FLUCONAZOLE 150 MG/1
150 TABLET ORAL DAILY
Qty: 1 TABLET | Refills: 0 | Status: SHIPPED | OUTPATIENT
Start: 2023-04-29 | End: 2023-04-30

## 2023-04-29 RX ORDER — CLOTRIMAZOLE AND BETAMETHASONE DIPROPIONATE 10; .64 MG/G; MG/G
CREAM TOPICAL
Qty: 15 G | Refills: 1 | Status: SHIPPED | OUTPATIENT
Start: 2023-04-29 | End: 2023-09-15

## 2023-04-29 NOTE — PROGRESS NOTES
Sheba Menezes is a 22 y.o. female  presents to  [facility] with complaint of vulvar burning sensation. Reports this started about 1 week ago. Pt denies vaginal discharge, odor or irritation. She denies use of any new soaps, lotions but does report had recently started taking spirolactone.  She reports she had taken 1 diflucan as prescribed by an outside clinic with no relief. Pt is currently not sexually active. Unsure re LMP. UPT today, negative.      ROS:  GENERAL: No fever, chills, fatigability or weight loss.  VULVAR: No pain, no lesions and no itching.Reports burning sensation  VAGINAL: No relaxation, no abnormal bleeding and no lesions.  ABDOMEN: No abdominal pain. Denies nausea. Denies vomiting. No diarrhea. No constipation  URINARY: No incontinence, no nocturia, no frequency and no dysuria. Reports burning with urination.      Review of patient's allergies indicates:  No Known Allergies    Current Outpatient Medications:     galcanezumab-gnlm (EMGALITY PEN) 120 mg/mL PnIj, Inject 120 mg into the skin every 28 days., Disp: 1 mL, Rfl: 11    galcanezumab-gnlm (EMGALITY PEN) 120 mg/mL PnIj, Inject 120 mg into the skin every 28 days., Disp: 1 mL, Rfl: 11    norethindrone-e.estradioL-iron (MIBELAS 24 FE) 1 mg-20 mcg(24) /75 mg (4) Chew, Take 1 tablet by mouth once daily., Disp: 84 tablet, Rfl: 3    TRUDHESA 0.725 mg/pump act. (4 mg/mL) Spry, USE 1 SPRAY IN EACH NOSTRIL AS NEEDED AT ONSET OF MIGRAINE. MAY REPEAT IN 1 HOUR. MAXIMUM 2 DOSES PER DAY. MAXIMUM 3 DOSES WITHIN 7 DAYS., Disp: 4 mL, Rfl: 4    vortioxetine (TRINTELLIX) 10 mg Tab, Take 1 tablet (10 mg total) by mouth once daily., Disp: 30 tablet, Rfl: 12    clotrimazole-betamethasone 1-0.05% (LOTRISONE) cream, Apply to affected area 2 times daily, Disp: 15 g, Rfl: 1    fluconazole (DIFLUCAN) 150 MG Tab, Take 1 tablet (150 mg total) by mouth once daily. for 1 day, Disp: 1 tablet, Rfl: 0    Current Facility-Administered Medications:      "onabotulinumtoxina injection 200 Units, 200 Units, Intramuscular, Q90 Days, Galen Dailey MD, 200 Units at 10/11/22 1175    Past Medical History:   Diagnosis Date    Anxiety     Borderline diabetes     Depression     Migraine     Polycystic ovary      Past Surgical History:   Procedure Laterality Date    HIP SURGERY       Social History     Tobacco Use    Smoking status: Never    Smokeless tobacco: Never   Substance Use Topics    Alcohol use: Yes     Comment: rare    Drug use: Never     OB History    Para Term  AB Living   0 0 0 0 0 0   SAB IAB Ectopic Multiple Live Births   0 0 0 0 0       /64   Ht 5' 1" (1.549 m)   LMP  (LMP Unknown) Comment: Birth control  BMI 29.43 kg/m²     PHYSICAL EXAM:  GENERAL: Calm and appropriate affect, alert, oriented x4  SKIN: Color appropriate for race, warm and dry, clean and intact with no rashes.  RESP: Even, unlabored breathing  ABDOMEN: Soft, nontender, no masses.  :   Normal external female genitalia without lesions. Normal hair distribution. Adequate perineal body, normal urethral meatus.Labia minora/  Introitus reddened and irritated bilaterally, no lesions,   pt report pain with inspection of skin at introitus  Vagina pink and well rugated, no lesions, vaginal discharge -  slight, no odor  No significant cystocele or rectocele.  Cervix -no cervical motion tenderness.      ASSESSMENT / PLAN:    ICD-10-CM ICD-9-CM    1. Acute vulvitis  N76.2 616.10 fluconazole (DIFLUCAN) 150 MG Tab      clotrimazole-betamethasone 1-0.05% (LOTRISONE) cream        Acute vulvitis  -     fluconazole (DIFLUCAN) 150 MG Tab; Take 1 tablet (150 mg total) by mouth once daily. for 1 day  Dispense: 1 tablet; Refill: 0  -     clotrimazole-betamethasone 1-0.05% (LOTRISONE) cream; Apply to affected area 2 times daily  Dispense: 15 g; Refill: 1          Patient was counseled today on vaginitis prevention including :  a. avoiding feminine products such as deoderant soaps, body wash, " bubble bath, douches, scented toilet paper, deoderant tampons or pads, feminine wipes, chronic pad use, etc.  b. avoiding other vulvovaginal irritants such as long hot baths, humidity, tight, synthetic clothing, chlorine and sitting around in wet bathing suits  c. wearing cotton underwear, avoiding thong underwear and no underwear to bed  d. taking showers instead of baths and use a hair dryer on cool setting afterwards to dry  e. wearing cotton to exercise and shower immediately after exercise and change clothes  f. using polyurethane condoms without spermicide if sexually active and symptoms are triggered by intercourse  g. Discussed use of vagisil, along with repHresh and probiotics    FOLLOW UP:   Pending lab results, PRN lack of improvement.

## 2023-05-01 ENCOUNTER — PATIENT MESSAGE (OUTPATIENT)
Dept: OBSTETRICS AND GYNECOLOGY | Facility: CLINIC | Age: 23
End: 2023-05-01
Payer: COMMERCIAL

## 2023-05-01 DIAGNOSIS — N76.0 BV (BACTERIAL VAGINOSIS): Primary | ICD-10-CM

## 2023-05-01 DIAGNOSIS — B96.89 BV (BACTERIAL VAGINOSIS): Primary | ICD-10-CM

## 2023-05-01 RX ORDER — METRONIDAZOLE 500 MG/1
500 TABLET ORAL 2 TIMES DAILY
Qty: 14 TABLET | Refills: 0 | Status: SHIPPED | OUTPATIENT
Start: 2023-05-01 | End: 2023-05-08

## 2023-06-02 ENCOUNTER — PATIENT MESSAGE (OUTPATIENT)
Dept: NEUROLOGY | Facility: CLINIC | Age: 23
End: 2023-06-02
Payer: COMMERCIAL

## 2023-06-23 ENCOUNTER — PATIENT MESSAGE (OUTPATIENT)
Dept: OBSTETRICS AND GYNECOLOGY | Facility: CLINIC | Age: 23
End: 2023-06-23
Payer: COMMERCIAL

## 2023-06-27 NOTE — PROGRESS NOTES
OBSTETRICS AND GYNECOLOGY    Subjective:      Chief Complaint:  VV    HPI:  Sheba Menezes is an 22 y.o.  presenting with concerns of vulvovaginitis.   Started a few months ago, a daily sensation. Localized to vulva. Described as a burning sensation. No vaginal itch, rash, odor, or discharge. Lasts for several days at a time.    Improved with BV treatment (, metrogel), but not complete resolution. Drinking water with subsequent voiding helps. Does not think dysuria, thinks vulva irritation.   Temporal association:  worse in AM. Prior episodes:  yes, after beach or pool, but now persistent without those factors.   No constitutional symptoms.  No dysuria, hematuria, or fevers. Does endorse new products, but not new for her to change shampoos. No new pad/tampon type. She is not currently sexually active. She is currently using oral contraceptives (estrogen/progesterone) for contraception.    Review of systems:  Denies fever/chills, abnormal vaginal bleeding or discharge, or dysuria.     OB History    Para Term  AB Living   0 0 0 0 0 0   SAB IAB Ectopic Multiple Live Births   0 0 0 0 0     Past Medical History:   Diagnosis Date    Anxiety     Borderline diabetes     Depression     Migraine     Polycystic ovary      Past Surgical History:   Procedure Laterality Date    HIP SURGERY       Family History   Problem Relation Age of Onset    Breast cancer Paternal Aunt     Brain cancer Maternal Grandfather     Colon cancer Neg Hx     Ovarian cancer Neg Hx     Uterine cancer Neg Hx     Melanoma Neg Hx     Pancreatic cancer Neg Hx     Prostate cancer Neg Hx        Allergies: Review of patient's allergies indicates:  No Known Allergies    Medications:   Current Outpatient Medications   Medication Sig Dispense Refill    clotrimazole-betamethasone 1-0.05% (LOTRISONE) cream Apply to affected area 2 times daily 15 g 1    EPIDUO FORTE 0.3-2.5 % GlwP Apply 1 application topically every evening.       "galcanezumab-gnlm (EMGALITY PEN) 120 mg/mL PnIj Inject 120 mg into the skin every 28 days. 1 mL 11    norethindrone-e.estradioL-iron (MIBELAS 24 FE) 1 mg-20 mcg(24) /75 mg (4) Chew Take 1 tablet by mouth once daily. 84 tablet 3    clotrimazole-betamethasone 1-0.05% (LOTRISONE) cream Apply topically 2 (two) times daily. Do not use for more than 7 days in a row. For external use only. 15 g 0    galcanezumab-gnlm (EMGALITY PEN) 120 mg/mL PnIj Inject 120 mg into the skin every 28 days. (Patient not taking: Reported on 7/3/2023) 1 mL 11    spironolactone (ALDACTONE) 25 MG tablet Take by mouth.      terconazole (TERAZOL 3) 0.8 % vaginal cream Place vaginally.      TRUDHESA 0.725 mg/pump act. (4 mg/mL) Spry USE 1 SPRAY IN EACH NOSTRIL AS NEEDED AT ONSET OF MIGRAINE. MAY REPEAT IN 1 HOUR. MAXIMUM 2 DOSES PER DAY. MAXIMUM 3 DOSES WITHIN 7 DAYS. (Patient not taking: Reported on 7/3/2023) 4 mL 4    vortioxetine (TRINTELLIX) 10 mg Tab Take 1 tablet (10 mg total) by mouth once daily. (Patient not taking: Reported on 7/3/2023) 30 tablet 12     Current Facility-Administered Medications   Medication Dose Route Frequency Provider Last Rate Last Admin    onabotulinumtoxina injection 200 Units  200 Units Intramuscular Q90 Days Galen Dailey MD   200 Units at 10/11/22 0867       Social History     Tobacco Use    Smoking status: Never    Smokeless tobacco: Never   Substance Use Topics    Alcohol use: Yes     Comment: once a week       Objective:   /78   Ht 5' 1" (1.549 m)   Wt 70.5 kg (155 lb 6.8 oz)   BMI 29.37 kg/m²   Physical Exam    GENERAL: Alert, well dressed, well nourished. Appropriate mood and affect. Pleasant.  HEENT: Normocephalic, atraumatic. Extraocular movements intact. Hearing and vision grossly intact.   PULMONARY: No respiratory distress. No use of accessory muscles of respiration. No cyanosis. Speaking comfortably in full sentences.   CARDIAC: Well perfused.    ABDOMEN: Soft. Non-tender, non-distended. No " rebound, guarding, or rigidity.   EXTREMITIES: No edema. No visible rashes.     PELVIC:   EXTERNAL GENITALIA: No lesions or masses, non-erythematous. Irritation localized to medial labia majora and minora, no rash, no excoriations, no lesions, no skin changes, no warmth.  URETHRA: Patent, no discharge.   VAGINA: Pink, moist, rugated, minimal white/filmy discharge.   CERVIX: Nulliparous, no lesions or masses, os closed, no blood or discharge per os, no cervical motion tenderness.   UTERUS: Firm, mobile, midline, non-tender.   ADNEXA: Non-tender, non-palpable.    Assessment/Plan:     Vulvar Irritation  - Affirm, UA/culture today  - Declines STD testing today  - Vulvar hygiene measures discussed, including use of cotton underwear, try no underwear if possible while sleeping, avoid douching, avoid introduction of new products at this time, avoid colored/scented toilet paper/products, changing out of wet/exercise clothes  - Rx lotrizone  - Follow up results, for plan/treatment course          As of April 1, 2021, the Cures Act has been passed nationally. This new law requires that all doctors progress notes, lab results, pathology reports and radiology reports be released IMMEDIATELY to the patient in the patient portal. That means that the results are released to you at the EXACT same time they are released to me. Therefore, with all of the patients that I have I am not able to reply to each patient exactly when the results come in. So there will be a delay from when you see the results to when I see them and have time to come up with a response to send you. Also I only see these results when I am on the computer at work. So if the results come in over the weekend or after 5 pm of a work day, I will not see them until the next business day. As you can tell, this is a challenge as a physician to give every patient the quick response they hope for and deserve. So please be patient!   Thanks for your understanding and  patience.

## 2023-07-03 ENCOUNTER — OFFICE VISIT (OUTPATIENT)
Dept: OBSTETRICS AND GYNECOLOGY | Facility: CLINIC | Age: 23
End: 2023-07-03
Payer: COMMERCIAL

## 2023-07-03 VITALS
SYSTOLIC BLOOD PRESSURE: 112 MMHG | BODY MASS INDEX: 29.35 KG/M2 | DIASTOLIC BLOOD PRESSURE: 78 MMHG | HEIGHT: 61 IN | WEIGHT: 155.44 LBS

## 2023-07-03 DIAGNOSIS — N90.89 VULVAR IRRITATION: ICD-10-CM

## 2023-07-03 DIAGNOSIS — R10.2 PELVIC PAIN: Primary | ICD-10-CM

## 2023-07-03 LAB
BILIRUB UR QL STRIP: NEGATIVE
CLARITY UR REFRACT.AUTO: CLEAR
COLOR UR AUTO: YELLOW
GLUCOSE UR QL STRIP: NEGATIVE
HGB UR QL STRIP: NEGATIVE
KETONES UR QL STRIP: NEGATIVE
LEUKOCYTE ESTERASE UR QL STRIP: NEGATIVE
NITRITE UR QL STRIP: NEGATIVE
PH UR STRIP: 6 [PH] (ref 5–8)
PROT UR QL STRIP: NEGATIVE
SP GR UR STRIP: 1.02 (ref 1–1.03)
URN SPEC COLLECT METH UR: NORMAL

## 2023-07-03 PROCEDURE — 1160F PR REVIEW ALL MEDS BY PRESCRIBER/CLIN PHARMACIST DOCUMENTED: ICD-10-PCS | Mod: CPTII,S$GLB,, | Performed by: STUDENT IN AN ORGANIZED HEALTH CARE EDUCATION/TRAINING PROGRAM

## 2023-07-03 PROCEDURE — 99214 PR OFFICE/OUTPT VISIT, EST, LEVL IV, 30-39 MIN: ICD-10-PCS | Mod: S$GLB,,, | Performed by: STUDENT IN AN ORGANIZED HEALTH CARE EDUCATION/TRAINING PROGRAM

## 2023-07-03 PROCEDURE — 99999 PR PBB SHADOW E&M-EST. PATIENT-LVL III: ICD-10-PCS | Mod: PBBFAC,,, | Performed by: STUDENT IN AN ORGANIZED HEALTH CARE EDUCATION/TRAINING PROGRAM

## 2023-07-03 PROCEDURE — 1159F MED LIST DOCD IN RCRD: CPT | Mod: CPTII,S$GLB,, | Performed by: STUDENT IN AN ORGANIZED HEALTH CARE EDUCATION/TRAINING PROGRAM

## 2023-07-03 PROCEDURE — 3074F SYST BP LT 130 MM HG: CPT | Mod: CPTII,S$GLB,, | Performed by: STUDENT IN AN ORGANIZED HEALTH CARE EDUCATION/TRAINING PROGRAM

## 2023-07-03 PROCEDURE — 3074F PR MOST RECENT SYSTOLIC BLOOD PRESSURE < 130 MM HG: ICD-10-PCS | Mod: CPTII,S$GLB,, | Performed by: STUDENT IN AN ORGANIZED HEALTH CARE EDUCATION/TRAINING PROGRAM

## 2023-07-03 PROCEDURE — 1159F PR MEDICATION LIST DOCUMENTED IN MEDICAL RECORD: ICD-10-PCS | Mod: CPTII,S$GLB,, | Performed by: STUDENT IN AN ORGANIZED HEALTH CARE EDUCATION/TRAINING PROGRAM

## 2023-07-03 PROCEDURE — 81514 NFCT DS BV&VAGINITIS DNA ALG: CPT | Performed by: STUDENT IN AN ORGANIZED HEALTH CARE EDUCATION/TRAINING PROGRAM

## 2023-07-03 PROCEDURE — 99214 OFFICE O/P EST MOD 30 MIN: CPT | Mod: S$GLB,,, | Performed by: STUDENT IN AN ORGANIZED HEALTH CARE EDUCATION/TRAINING PROGRAM

## 2023-07-03 PROCEDURE — 99999 PR PBB SHADOW E&M-EST. PATIENT-LVL III: CPT | Mod: PBBFAC,,, | Performed by: STUDENT IN AN ORGANIZED HEALTH CARE EDUCATION/TRAINING PROGRAM

## 2023-07-03 PROCEDURE — 3078F DIAST BP <80 MM HG: CPT | Mod: CPTII,S$GLB,, | Performed by: STUDENT IN AN ORGANIZED HEALTH CARE EDUCATION/TRAINING PROGRAM

## 2023-07-03 PROCEDURE — 81003 URINALYSIS AUTO W/O SCOPE: CPT | Performed by: STUDENT IN AN ORGANIZED HEALTH CARE EDUCATION/TRAINING PROGRAM

## 2023-07-03 PROCEDURE — 3008F BODY MASS INDEX DOCD: CPT | Mod: CPTII,S$GLB,, | Performed by: STUDENT IN AN ORGANIZED HEALTH CARE EDUCATION/TRAINING PROGRAM

## 2023-07-03 PROCEDURE — 87086 URINE CULTURE/COLONY COUNT: CPT | Performed by: STUDENT IN AN ORGANIZED HEALTH CARE EDUCATION/TRAINING PROGRAM

## 2023-07-03 PROCEDURE — 1160F RVW MEDS BY RX/DR IN RCRD: CPT | Mod: CPTII,S$GLB,, | Performed by: STUDENT IN AN ORGANIZED HEALTH CARE EDUCATION/TRAINING PROGRAM

## 2023-07-03 PROCEDURE — 3078F PR MOST RECENT DIASTOLIC BLOOD PRESSURE < 80 MM HG: ICD-10-PCS | Mod: CPTII,S$GLB,, | Performed by: STUDENT IN AN ORGANIZED HEALTH CARE EDUCATION/TRAINING PROGRAM

## 2023-07-03 PROCEDURE — 3008F PR BODY MASS INDEX (BMI) DOCUMENTED: ICD-10-PCS | Mod: CPTII,S$GLB,, | Performed by: STUDENT IN AN ORGANIZED HEALTH CARE EDUCATION/TRAINING PROGRAM

## 2023-07-03 RX ORDER — ADAPALENE AND BENZOYL PEROXIDE 3; 25 MG/G; MG/G
1 GEL TOPICAL NIGHTLY
COMMUNITY
Start: 2023-04-06

## 2023-07-03 RX ORDER — SPIRONOLACTONE 25 MG/1
TABLET ORAL
COMMUNITY
Start: 2023-04-06 | End: 2023-09-15

## 2023-07-03 RX ORDER — TERCONAZOLE 8 MG/G
CREAM VAGINAL
COMMUNITY
Start: 2023-04-20 | End: 2023-09-15

## 2023-07-03 RX ORDER — CLOTRIMAZOLE AND BETAMETHASONE DIPROPIONATE 10; .64 MG/G; MG/G
CREAM TOPICAL 2 TIMES DAILY
Qty: 15 G | Refills: 0 | Status: SHIPPED | OUTPATIENT
Start: 2023-07-03 | End: 2023-09-15

## 2023-07-04 LAB
BACTERIA UR CULT: NO GROWTH
BACTERIAL VAGINOSIS DNA: NEGATIVE
CANDIDA GLABRATA DNA: NEGATIVE
CANDIDA KRUSEI DNA: NEGATIVE
CANDIDA RRNA VAG QL PROBE: NEGATIVE
T VAGINALIS RRNA GENITAL QL PROBE: NEGATIVE

## 2023-07-13 ENCOUNTER — PROCEDURE VISIT (OUTPATIENT)
Dept: NEUROLOGY | Facility: CLINIC | Age: 23
End: 2023-07-13
Payer: COMMERCIAL

## 2023-07-13 VITALS
HEART RATE: 92 BPM | WEIGHT: 155.31 LBS | DIASTOLIC BLOOD PRESSURE: 73 MMHG | SYSTOLIC BLOOD PRESSURE: 106 MMHG | BODY MASS INDEX: 29.32 KG/M2 | HEIGHT: 61 IN

## 2023-07-13 DIAGNOSIS — G43.709 CHRONIC MIGRAINE WITHOUT AURA WITHOUT STATUS MIGRAINOSUS, NOT INTRACTABLE: Primary | ICD-10-CM

## 2023-07-13 PROCEDURE — 64615 PR CHEMODENERVATION OF MUSCLE FOR CHRONIC MIGRAINE: ICD-10-PCS | Mod: S$GLB,,, | Performed by: PSYCHIATRY & NEUROLOGY

## 2023-07-13 PROCEDURE — 64615 CHEMODENERV MUSC MIGRAINE: CPT | Mod: S$GLB,,, | Performed by: PSYCHIATRY & NEUROLOGY
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
## 2023-09-03 ENCOUNTER — PATIENT MESSAGE (OUTPATIENT)
Dept: OBSTETRICS AND GYNECOLOGY | Facility: CLINIC | Age: 23
End: 2023-09-03
Payer: COMMERCIAL

## 2023-09-12 ENCOUNTER — PROCEDURE VISIT (OUTPATIENT)
Dept: NEUROLOGY | Facility: CLINIC | Age: 23
End: 2023-09-12
Payer: COMMERCIAL

## 2023-09-12 VITALS
DIASTOLIC BLOOD PRESSURE: 72 MMHG | HEIGHT: 61 IN | WEIGHT: 161.38 LBS | BODY MASS INDEX: 30.47 KG/M2 | SYSTOLIC BLOOD PRESSURE: 105 MMHG | HEART RATE: 93 BPM

## 2023-09-12 DIAGNOSIS — G43.709 CHRONIC MIGRAINE WITHOUT AURA WITHOUT STATUS MIGRAINOSUS, NOT INTRACTABLE: Primary | ICD-10-CM

## 2023-09-12 PROCEDURE — 64615 PR CHEMODENERVATION OF MUSCLE FOR CHRONIC MIGRAINE: ICD-10-PCS | Mod: S$GLB,,, | Performed by: PSYCHIATRY & NEUROLOGY

## 2023-09-12 PROCEDURE — 64615 CHEMODENERV MUSC MIGRAINE: CPT | Mod: S$GLB,,, | Performed by: PSYCHIATRY & NEUROLOGY
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
## 2023-09-15 ENCOUNTER — OFFICE VISIT (OUTPATIENT)
Dept: OBSTETRICS AND GYNECOLOGY | Facility: CLINIC | Age: 23
End: 2023-09-15
Payer: COMMERCIAL

## 2023-09-15 VITALS
DIASTOLIC BLOOD PRESSURE: 80 MMHG | WEIGHT: 160.94 LBS | BODY MASS INDEX: 30.39 KG/M2 | SYSTOLIC BLOOD PRESSURE: 104 MMHG | HEIGHT: 61 IN

## 2023-09-15 DIAGNOSIS — R10.2 PELVIC PAIN: Primary | ICD-10-CM

## 2023-09-15 PROCEDURE — 3079F DIAST BP 80-89 MM HG: CPT | Mod: CPTII,S$GLB,, | Performed by: STUDENT IN AN ORGANIZED HEALTH CARE EDUCATION/TRAINING PROGRAM

## 2023-09-15 PROCEDURE — 1159F PR MEDICATION LIST DOCUMENTED IN MEDICAL RECORD: ICD-10-PCS | Mod: CPTII,S$GLB,, | Performed by: STUDENT IN AN ORGANIZED HEALTH CARE EDUCATION/TRAINING PROGRAM

## 2023-09-15 PROCEDURE — 3079F PR MOST RECENT DIASTOLIC BLOOD PRESSURE 80-89 MM HG: ICD-10-PCS | Mod: CPTII,S$GLB,, | Performed by: STUDENT IN AN ORGANIZED HEALTH CARE EDUCATION/TRAINING PROGRAM

## 2023-09-15 PROCEDURE — 1159F MED LIST DOCD IN RCRD: CPT | Mod: CPTII,S$GLB,, | Performed by: STUDENT IN AN ORGANIZED HEALTH CARE EDUCATION/TRAINING PROGRAM

## 2023-09-15 PROCEDURE — 3008F BODY MASS INDEX DOCD: CPT | Mod: CPTII,S$GLB,, | Performed by: STUDENT IN AN ORGANIZED HEALTH CARE EDUCATION/TRAINING PROGRAM

## 2023-09-15 PROCEDURE — 3008F PR BODY MASS INDEX (BMI) DOCUMENTED: ICD-10-PCS | Mod: CPTII,S$GLB,, | Performed by: STUDENT IN AN ORGANIZED HEALTH CARE EDUCATION/TRAINING PROGRAM

## 2023-09-15 PROCEDURE — 3074F SYST BP LT 130 MM HG: CPT | Mod: CPTII,S$GLB,, | Performed by: STUDENT IN AN ORGANIZED HEALTH CARE EDUCATION/TRAINING PROGRAM

## 2023-09-15 PROCEDURE — 99999 PR PBB SHADOW E&M-EST. PATIENT-LVL III: CPT | Mod: PBBFAC,,, | Performed by: STUDENT IN AN ORGANIZED HEALTH CARE EDUCATION/TRAINING PROGRAM

## 2023-09-15 PROCEDURE — 3074F PR MOST RECENT SYSTOLIC BLOOD PRESSURE < 130 MM HG: ICD-10-PCS | Mod: CPTII,S$GLB,, | Performed by: STUDENT IN AN ORGANIZED HEALTH CARE EDUCATION/TRAINING PROGRAM

## 2023-09-15 PROCEDURE — 99213 OFFICE O/P EST LOW 20 MIN: CPT | Mod: S$GLB,,, | Performed by: STUDENT IN AN ORGANIZED HEALTH CARE EDUCATION/TRAINING PROGRAM

## 2023-09-15 PROCEDURE — 99213 PR OFFICE/OUTPT VISIT, EST, LEVL III, 20-29 MIN: ICD-10-PCS | Mod: S$GLB,,, | Performed by: STUDENT IN AN ORGANIZED HEALTH CARE EDUCATION/TRAINING PROGRAM

## 2023-09-15 PROCEDURE — 99999 PR PBB SHADOW E&M-EST. PATIENT-LVL III: ICD-10-PCS | Mod: PBBFAC,,, | Performed by: STUDENT IN AN ORGANIZED HEALTH CARE EDUCATION/TRAINING PROGRAM

## 2023-09-15 NOTE — PROGRESS NOTES
OBSTETRICS AND GYNECOLOGY    Subjective:      Chief Complaint: Vaginal Pain    HPI:  Sheba Menezes is an 22 y.o.  presenting for f/u of vaginal pain.   Localized to distal aspect of vaginal canal. No UTI, fevers, dysuria, or hematuria. No vaginal odor, rash, or itch. Increased amount slightly. Not currently sexually active.   Endorses pain with riding bike, wearing tight clothing. Placing tampon painful. Has not been using tampons. Discontinued OCPs about 2 weeks ago and pain started to improve a few days afterwards. Currently with no pain.    Review of systems:  Denies abnormal vaginal bleeding or discharge, or dysuria.     OB History    Para Term  AB Living   0 0 0 0 0 0   SAB IAB Ectopic Multiple Live Births   0 0 0 0 0     Past Medical History:   Diagnosis Date    Anxiety     Borderline diabetes     Depression     Migraine     Polycystic ovary      Past Surgical History:   Procedure Laterality Date    HIP SURGERY       Family History   Problem Relation Age of Onset    Breast cancer Paternal Aunt     Brain cancer Maternal Grandfather     Colon cancer Neg Hx     Ovarian cancer Neg Hx     Uterine cancer Neg Hx     Melanoma Neg Hx     Pancreatic cancer Neg Hx     Prostate cancer Neg Hx        Allergies: Review of patient's allergies indicates:  No Known Allergies    Medications:   Current Outpatient Medications   Medication Sig Dispense Refill    EPIDUO FORTE 0.3-2.5 % GlwP Apply 1 application topically every evening.      galcanezumab-gnlm (EMGALITY PEN) 120 mg/mL PnIj Inject 120 mg into the skin every 28 days. 1 mL 11    galcanezumab-gnlm (EMGALITY PEN) 120 mg/mL PnIj Inject 120 mg into the skin every 28 days. 1 mL 11    norethindrone-e.estradioL-iron (MIBELAS 24 FE) 1 mg-20 mcg(24) /75 mg (4) Chew Take 1 tablet by mouth once daily. 84 tablet 3    vortioxetine (TRINTELLIX) 10 mg Tab Take 1 tablet (10 mg total) by mouth once daily. 30 tablet 12     Current Facility-Administered Medications  "  Medication Dose Route Frequency Provider Last Rate Last Admin    onabotulinumtoxina injection 200 Units  200 Units Intramuscular Q90 Days Galen Dailey MD   200 Units at 09/12/23 1703       Social History     Tobacco Use    Smoking status: Never    Smokeless tobacco: Never   Substance Use Topics    Alcohol use: Yes     Comment: once a week       Objective:   /80 (BP Location: Left arm, Patient Position: Sitting)   Ht 5' 1" (1.549 m)   Wt 73 kg (160 lb 15 oz)   LMP 08/16/2023 (Approximate)   BMI 30.41 kg/m²   Physical Exam    GENERAL: Alert, well dressed, well nourished. Appropriate mood and affect. Pleasant.  HEENT: Normocephalic, atraumatic. Extraocular movements intact. Hearing and vision grossly intact.   PULMONARY: No respiratory distress. No use of accessory muscles of respiration. No cyanosis. Speaking comfortably in full sentences.   CARDIAC: Well perfused.    EXTREMITIES: No visible rashes.   PELVIC: Deferred.    Assessment/Plan:     Vaginal Pain  - Ddx reviewed  - Options discussed, asymptomatic currently, plan to start pelvic floor PT, consult placed  - Consider vaginal estrogen cream, topical lidocaine, different OCP/modality, vaginal probiotic trial if pain returns      As of April 1, 2021, the Cures Act has been passed nationally. This new law requires that all doctors progress notes, lab results, pathology reports and radiology reports be released IMMEDIATELY to the patient in the patient portal. That means that the results are released to you at the EXACT same time they are released to me. Therefore, with all of the patients that I have I am not able to reply to each patient exactly when the results come in. So there will be a delay from when you see the results to when I see them and have time to come up with a response to send you. Also I only see these results when I am on the computer at work. So if the results come in over the weekend or after 5 pm of a work day, I will not see them " until the next business day. As you can tell, this is a challenge as a physician to give every patient the quick response they hope for and deserve. So please be patient!   Thanks for your understanding and patience.

## 2023-11-22 DIAGNOSIS — G43.909 MIGRAINE WITHOUT STATUS MIGRAINOSUS, NOT INTRACTABLE, UNSPECIFIED MIGRAINE TYPE: Primary | ICD-10-CM

## 2023-12-07 ENCOUNTER — PROCEDURE VISIT (OUTPATIENT)
Dept: NEUROLOGY | Facility: CLINIC | Age: 23
End: 2023-12-07
Payer: COMMERCIAL

## 2023-12-07 VITALS
DIASTOLIC BLOOD PRESSURE: 68 MMHG | HEART RATE: 86 BPM | WEIGHT: 146.38 LBS | HEIGHT: 61 IN | SYSTOLIC BLOOD PRESSURE: 96 MMHG | BODY MASS INDEX: 27.64 KG/M2

## 2023-12-07 DIAGNOSIS — G43.709 CHRONIC MIGRAINE WITHOUT AURA WITHOUT STATUS MIGRAINOSUS, NOT INTRACTABLE: Primary | ICD-10-CM

## 2023-12-07 PROCEDURE — 64615 CHEMODENERV MUSC MIGRAINE: CPT | Mod: S$GLB,,, | Performed by: PSYCHIATRY & NEUROLOGY

## 2023-12-07 PROCEDURE — 64615 PR CHEMODENERVATION OF MUSCLE FOR CHRONIC MIGRAINE: ICD-10-PCS | Mod: S$GLB,,, | Performed by: PSYCHIATRY & NEUROLOGY

## 2023-12-07 NOTE — PROCEDURES
"Follow up:   Overall doing better   Reports wear off 2 weeks ago     Prior note:   Has migraines most days of the month     Prior note:   Reports daily occipital HA x 4 weeks   Unknown trigger   Some use of alleve   No ER or UC   Sleep - good   Appetite - good       Prior note:   Still has chr migraine daily   Started Reglan 10 mg PRN        NO notes:  2016 fell off a kasia, fell 40 feet and hit her head and back on the way down. Had loss of consciousness for several seconds. Not sure if she had post concussive syndrome.      Neurologist in [place] has been treating headaches up til this point. Has had them since childhood, but they have gotten worse. Got prism glasses for reading from ophthalmologist in [place] in 9/2020 but they have not helped.      Mom with headaches.      10/1/2021 Nicks:    "Patient reports dull headaches through   out the entire day, with episodes of intense headaches mixed inbetween.   During the episodes, pt reports vertical double vision. Pt has to go to   sleep to make headache and diplopia go away. Pt states that her PCP   recommended she take 4 Tylenols but they do not help. Pt states that she   occasionally becomes nauseous with blurred vision during the intense   episodes. Pt states eye pain that comes and go. She also reports nausea,   face numbness, and neck burning during the episodes. Overall no medication   has helped. Patient was in the ER 09/18 with the same complaints. MRI was   performed which was WNL. She reports no changes in vision or symptoms   since the MRI was performed.      Patient recently had an eye exam with [facility] in [place], [place].   She was given prism glasses to use for reading and occasionally for night   driving. Patient reports that the eye doctor told her she had an eye   muscle problem. She does not like the glasses because her vision is   blurred through them. If she takes the glasses off, her vision is clear.   The glasses also give " "her a headache/strained eyes. "  ?  Neuro-ophthalmologic examination was notable for excellent visual acuities, full color vision, good convergence, moderate XT at near, fair convergence amplitude. I suspect she is less able to converge during severe headaches. I recommended using her prism glasses for reading only when symptomatic     studying musical theater, sophomore      Headache history:   Age of onset -  childhood   Location - occipital, behind eyes   Nature of pain -  Throbbing   Prodrome - no   Aura -  No   Duration of headache - > 4 hrs   Time to peak intensity - hrs   Pain scale - range of intensity -  9/10  Frequency -  Daily since April   Status Migrainosus history - yes  Time of day of most headaches- anytime      Associated symptoms with the headache:   Meningeal symptoms - photophobia, phonophobia, exercise intolerance +   Nausea/vomitting +   Neck pain +      Cluster headache symptoms: none      Symptoms of increased intracranial pressure:   Whooshing sounds   Visual spots/blotches      Basilar migraine symptoms:none      Headache Triggers:  unkown      Other comorbid conditions:  Anxiety - yes   Motion sickness symptom - no      Treatment history:  Resolution of headache with sleep - yes   Meds tried:  Failed topamax, elavil, cymbalta, lexapro    On OCP monjhly - no change   Ubrelvy - not help   AIMOVIG x 2 - not help   relpax - not help   Propranolol  -n/v   Emgality - mild imprvmnt   BOTOX #1 1/14/22 - helped    BOTOX #2 2/14/22 - helped   BOTOX #3 in July in [place]   BOTOX #4 1/26/23 - helped    BOTOX #5 3/9/23 - helped    BOTOX #6 7/13/23 - helped    BOTOX #7 9/12/23 - helped      Qulipta 60 mg - vomiting     Care Timeline     1649    ketorolac tromethamine 15 mg       metoclopramide HCl 10 mg   1650    0.9 % sodium chloride 1000 mL   1705    Comprehensive metabolic panel        HIV 1/2 Ag/Ab (4th Gen)       Hepatitis C Antibody       CBC auto differential   1706    POCT urine pregnancy   1712    " ISTAT PROCEDURE    1802    MRI Brain W WO Contrast   1803    gadobutrol 6 mL   1848    potassium bicarbonate/cit ac 50 mEq   1933    Discharged         Headache risk factors:   H/o TBI  - 2010 [facility] with concussion, 2015 fell off kasia   H/o Meningitis  - no   F/h Aneurysms  - no     Review of Systems   Constitutional: Negative.    HENT: Negative.    Eyes: Negative.    Respiratory: Negative.    Cardiovascular: Negative.    Gastrointestinal: Negative.    Endocrine: Negative.    Genitourinary: Negative.    Musculoskeletal: Negative.    Integumentary:  Negative.   Allergic/Immunologic: Negative.    Neurological: Positive for headaches.   Hematological: Negative.    Psychiatric/Behavioral: Positive for sleep disturbance.          Objective:   Physical Exam  Constitutional:       Appearance: Normal appearance.      Psychiatric:         Mood and Affect: Mood normal.         Behavior: Behavior normal.         Thought Content: Thought content normal.         Judgment: Judgment normal.          Headache Exam:  Optic disc is flat OU   Temples appear symmetric  No V1,V2,V3 distribution allodynia/hyperalgesia adrien   No facial swelling  No gross TMJ abnormalities   No ptosis   No conj injection   No meningismus   No neck stiffness  No C spine tenderness   Cervical facet tenderness on palpation adrien    tender points over trapezium   adrein  supraorbital nerve exit point tenderness  adrien  occipital nerve exit point tenderness  No auriculotemporal nerve tenderness      Limited ROM neck   Assessment:   1, Migraine - chronic        MR Impression:   No acute intracranial abnormality.  No findings to suggest dural venous sinus thrombosis.     Electronically signed by resident: Betty Ruiz  Date:                                            09/18/2021  Time:                                           18:03     Electronically signed by: John Blake MD  Date:                                            09/18/2021  Time:                                            18:24        MRI FINDINGS:  Alignment: Straightening of the normal cervical lordosis.     Vertebrae: No diffuse marrow signal abnormality to suggest infiltrative process.  No evidence of acute fractures.  Vertebral body heights are well maintained.     Discs: Disc heights are well maintained with normal disc signal.     Cord: Normal contour and signal.     Skull base and craniocervical junction: Normal.     Degenerative findings:   C2-C3: No significant spinal canal stenosis or neural foraminal narrowing.   C3-C4: No significant spinal canal stenosis or neural foraminal narrowing.   C4-C5: No significant spinal canal stenosis or neural foraminal narrowing.   C5-C6: No significant spinal canal stenosis or neural foraminal narrowing.   C6-C7: No significant spinal canal stenosis or neural foraminal narrowing.   C7-T1: No significant spinal canal stenosis or neural foraminal narrowing.   T1-T2: No significant spinal canal stenosis or neural foraminal narrowing.   Paraspinal muscles & soft tissues: Normal.     Impression:   No significant abnormality identified.     Electronically signed by resident: Matthew Preston  Date:                                            10/23/2021  Time:                                           13:03     Electronically signed by: Humphrey Jordan  Date:                                            10/23/2021  Time:                                           14:05     Results for HAYNES LALI HAWK (MRN [id number]) as of 12/16/2021 16:19    Ref. Range 10/14/2021 14:02   Ferritin Latest Ref Range: 20.0 - 300.0 ng/mL 92   Vitamin B-12 Latest Ref Range: 210 - 950 pg/mL 414   Sed Rate Latest Ref Range: 0 - 36 mm/Hr 8   CRP Latest Ref Range: 0.0 - 8.2 mg/L 3.9   Thiamine Latest Ref Range: 38 - 122 ug/L 48   Vitamin B2 Latest Ref Range: 1 - 19 mcg/L 5   Vitamin B6 Latest Ref Range: 5 - 50 ug/L 9   TSH Latest Ref Range: 0.400 - 4.000 uIU/mL 0.969   Lead, Blood Latest Ref Range: <5.0  mcg/dL <1.0   LALI Screen Latest Ref Range: Negative <1:80  Positive (A)   LALI Titer 1 Unknown 1:80   LALI PATTERN 1 Unknown Homogeneous   LALI Titer 2 Unknown 1:80   LALI Pattern 2 Unknown Speckled   ds DNA Ab Latest Ref Range: Negative 1:10  Negative 1:10   Anti-SSA Antibody Latest Ref Range: 0.00 - 0.99 Ratio 0.06   Anti-SSA Interpretation Latest Ref Range: Negative  Negative   Anti-SSB Antibody Latest Ref Range: 0.00 - 0.99 Ratio 0.19   Anti-SSB Interpretation Latest Ref Range: Negative  Negative   Anti Sm Antibody Latest Ref Range: 0.00 - 0.99 Ratio 0.09   Anti-Sm Interpretation Latest Ref Range: Negative  Negative   Anti Sm/RNP Antibody Latest Ref Range: 0.00 - 0.99 Ratio 0.09   Anti-Sm/RNP Interpretation Latest Ref Range: Negative  Negative   Antigliadin Ab IgG Latest Ref Range: <20 UNITS 2   Antigliadin Abs, IgA Latest Ref Range: <20 UNITS 3   TTG IgA Latest Ref Range: <20 UNITS 5   TTG IgG Latest Ref Range: <20 UNITS 3   Immunoglobulin A (IgA) Latest Ref Range: 70 - 400 mg/dL 117   Rheumatoid Factor Latest Ref Range: 0.0 - 15.0 IU/mL <13.0   Venous/Capillary Unknown venous      Plan:   1. Cont BOTOX   Emgality (Nov 2021-)   2, Breakthrough headache - tylenol, reyvow, zofran   Multiple alternative treatment options were offered to the patient  3. Occipital neuralgia - If medical management is ineffective may consider occipital nerve blocks.   4. I urged the patient to keep a headache calender.      Procedures    BOTOX treatment response:   Prior to initiating BOTOX, the patient had   25 migraine days per month on average. This meets criteria for chronic migraine.   After starting BOTOX, the patient experienced a reduction in  15  days per month   After starting BOTOX, the patient experienced a reduction in > 100 hours of migraine symptoms per month   After starting BOTOX, the patient experienced a 50% reduction in burden of migraine days per month   Based on this information, BOTOX is medically necessary for the  management of the patient's chronic migraine.     BOTOX Injection intervals:   Patient reports a 'wearing off effect' prior to the subsequent BOTOX injections at 12 weeks. This occurs at week 10  Symptoms of this a 'wearing off effect' include - worsening of migraine headache frequency and intensity   Medications used during the 'wearing off effect' period include advil  Based on this information, it is medically necessary for the patient to receive BOTOX therapy at an interval of every 10 weeks for the management of chronic migraine.    BOTOX was performed as an indicated therapy for intractable chronic migraine headaches given that the patient failed > 3  prophylactic medications    Botulinum Toxin Injection Procedure   Pre-operative diagnosis: Chronic migraine   Post-operative diagnosis: Same   Procedure: Chemical neurolysis   After risks and benefits were explained including bleeding, infection, worsening of pain, damage to the areas being injected, weakness of muscles, loss of muscle control, dysphagia if injecting the head or neck, facial droop if injecting the facial area, painful injection, allergic or other reaction to the medications being injected, and the failure of the procedure to help the problem, a signed consent was obtained.   The patient was placed in a comfortable area and the sites to be treated were identified.The area to be treated was prepped three times with alcohol and the alcohol allowed to dry. Next, a 30 gauge needle was used to inject the medication in the area to be treated.   Area(s) injected:   Total Botox used: 155 Units   Botox wastage: 45 Units   Injection sites:    muscle bilaterally ( a total of 10 units divided into 2 sites)   Procerus muscle (5 units)   Frontalis muscle bilaterally (a total of 20 units divided into 4 sites)   Temporalis muscle bilaterally (a total of 40 units divided into 8 sites)   Occipitalis muscle bilaterally (a total of 30 units divided into 6  sites)   Cervical paraspinal muscles (a total of 20 units divided into 4 sites)   Trapezius muscle bilaterally (a total of 30 units divided into 6 sites)   Complications: none   RTC for the next Botox injection: 10 weeks

## 2023-12-08 ENCOUNTER — PATIENT OUTREACH (OUTPATIENT)
Dept: ADMINISTRATIVE | Facility: HOSPITAL | Age: 23
End: 2023-12-08
Payer: COMMERCIAL

## 2023-12-26 ENCOUNTER — PATIENT MESSAGE (OUTPATIENT)
Dept: OBSTETRICS AND GYNECOLOGY | Facility: CLINIC | Age: 23
End: 2023-12-26
Payer: COMMERCIAL

## 2023-12-29 DIAGNOSIS — N94.819 VULVODYNIA: Primary | ICD-10-CM

## 2023-12-29 RX ORDER — AMITRIPTYLINE HYDROCHLORIDE 10 MG/1
TABLET, FILM COATED ORAL
Qty: 30 TABLET | Refills: 2 | Status: SHIPPED | OUTPATIENT
Start: 2023-12-29

## 2024-01-29 DIAGNOSIS — G43.001 MIGRAINE WITHOUT AURA AND WITH STATUS MIGRAINOSUS, NOT INTRACTABLE: ICD-10-CM

## 2024-01-31 ENCOUNTER — PATIENT MESSAGE (OUTPATIENT)
Dept: NEUROLOGY | Facility: CLINIC | Age: 24
End: 2024-01-31
Payer: COMMERCIAL

## 2024-01-31 DIAGNOSIS — G43.001 MIGRAINE WITHOUT AURA AND WITH STATUS MIGRAINOSUS, NOT INTRACTABLE: ICD-10-CM

## 2024-02-01 RX ORDER — GALCANEZUMAB 120 MG/ML
120 INJECTION, SOLUTION SUBCUTANEOUS
Qty: 1 ML | Refills: 11 | Status: SHIPPED | OUTPATIENT
Start: 2024-02-01

## 2024-02-01 RX ORDER — GALCANEZUMAB 120 MG/ML
120 INJECTION, SOLUTION SUBCUTANEOUS
Qty: 1 ML | Refills: 11 | Status: SHIPPED | OUTPATIENT
Start: 2024-02-01 | End: 2024-02-08 | Stop reason: SDUPTHER

## 2024-02-07 ENCOUNTER — TELEPHONE (OUTPATIENT)
Dept: NEUROLOGY | Facility: CLINIC | Age: 24
End: 2024-02-07
Payer: COMMERCIAL

## 2024-02-07 NOTE — TELEPHONE ENCOUNTER
Gave Pre service a call to let them know that the treatment plan that Dr. Dailey has requested is most definitely medically urgent.

## 2024-02-08 ENCOUNTER — PROCEDURE VISIT (OUTPATIENT)
Dept: NEUROLOGY | Facility: CLINIC | Age: 24
End: 2024-02-08
Payer: COMMERCIAL

## 2024-02-08 VITALS
HEART RATE: 90 BPM | BODY MASS INDEX: 26.51 KG/M2 | SYSTOLIC BLOOD PRESSURE: 106 MMHG | WEIGHT: 140.44 LBS | DIASTOLIC BLOOD PRESSURE: 66 MMHG | HEIGHT: 61 IN

## 2024-02-08 DIAGNOSIS — G43.001 MIGRAINE WITHOUT AURA AND WITH STATUS MIGRAINOSUS, NOT INTRACTABLE: ICD-10-CM

## 2024-02-08 DIAGNOSIS — G43.709 CHRONIC MIGRAINE WITHOUT AURA WITHOUT STATUS MIGRAINOSUS, NOT INTRACTABLE: Primary | ICD-10-CM

## 2024-02-08 PROCEDURE — 64615 CHEMODENERV MUSC MIGRAINE: CPT | Mod: S$GLB,,, | Performed by: PSYCHIATRY & NEUROLOGY

## 2024-02-08 RX ORDER — GALCANEZUMAB 120 MG/ML
120 INJECTION, SOLUTION SUBCUTANEOUS
Qty: 1 ML | Refills: 11 | Status: SHIPPED | OUTPATIENT
Start: 2024-02-08

## 2024-02-08 NOTE — PROCEDURES
"Follow up:     Overall doing better   Reports wear off 2 weeks ago     Prior note:   Has migraines most days of the month     Prior note:   Reports daily occipital HA x 4 weeks   Unknown trigger   Some use of alleve   No ER or UC   Sleep - good   Appetite - good       Prior note:   Still has chr migraine daily   Started Reglan 10 mg PRN        NO notes:  2016 fell off a kasia, fell 40 feet and hit her head and back on the way down. Had loss of consciousness for several seconds. Not sure if she had post concussive syndrome.      Neurologist in [place] has been treating headaches up til this point. Has had them since childhood, but they have gotten worse. Got prism glasses for reading from ophthalmologist in [place] in 9/2020 but they have not helped.      Mom with headaches.      10/1/2021 Nicks:    "Patient reports dull headaches through   out the entire day, with episodes of intense headaches mixed inbetween.   During the episodes, pt reports vertical double vision. Pt has to go to   sleep to make headache and diplopia go away. Pt states that her PCP   recommended she take 4 Tylenols but they do not help. Pt states that she   occasionally becomes nauseous with blurred vision during the intense   episodes. Pt states eye pain that comes and go. She also reports nausea,   face numbness, and neck burning during the episodes. Overall no medication   has helped. Patient was in the ER 09/18 with the same complaints. MRI was   performed which was WNL. She reports no changes in vision or symptoms   since the MRI was performed.      Patient recently had an eye exam with [facility] in [place], [place].   She was given prism glasses to use for reading and occasionally for night   driving. Patient reports that the eye doctor told her she had an eye   muscle problem. She does not like the glasses because her vision is   blurred through them. If she takes the glasses off, her vision is clear.   The glasses also give " "her a headache/strained eyes. "  ?  Neuro-ophthalmologic examination was notable for excellent visual acuities, full color vision, good convergence, moderate XT at near, fair convergence amplitude. I suspect she is less able to converge during severe headaches. I recommended using her prism glasses for reading only when symptomatic     studying musical theater, sophomore      Headache history:   Age of onset -  childhood   Location - occipital, behind eyes   Nature of pain -  Throbbing   Prodrome - no   Aura -  No   Duration of headache - > 4 hrs   Time to peak intensity - hrs   Pain scale - range of intensity -  9/10  Frequency -  Daily since April   Status Migrainosus history - yes  Time of day of most headaches- anytime      Associated symptoms with the headache:   Meningeal symptoms - photophobia, phonophobia, exercise intolerance +   Nausea/vomitting +   Neck pain +      Cluster headache symptoms: none      Symptoms of increased intracranial pressure:   Whooshing sounds   Visual spots/blotches      Basilar migraine symptoms:none      Headache Triggers:  unkown      Other comorbid conditions:  Anxiety - yes   Motion sickness symptom - no      Treatment history:  Resolution of headache with sleep - yes   Meds tried:  Failed topamax, elavil, cymbalta, lexapro    On OCP monjhly - no change   Ubrelvy - not help   AIMOVIG x 2 - not help   relpax - not help   Propranolol  -n/v   Emgality - mild imprvmnt   BOTOX #1 1/14/22 - helped    BOTOX #2 2/14/22 - helped   BOTOX #3 in July in [place]   BOTOX #4 1/26/23 - helped    BOTOX #5 3/9/23 - helped    BOTOX #6 7/13/23 - helped    BOTOX #7 9/12/23 - helped    BOTOX #8 12/7/23 - helped      Qulipta 60 mg - vomiting     Care Timeline     1649    ketorolac tromethamine 15 mg       metoclopramide HCl 10 mg   1650    0.9 % sodium chloride 1000 mL   1705    Comprehensive metabolic panel        HIV 1/2 Ag/Ab (4th Gen)       Hepatitis C Antibody       CBC auto differential   1706    " POCT urine pregnancy   1712    ISTAT PROCEDURE    1802    MRI Brain W WO Contrast   1803    gadobutrol 6 mL   1848    potassium bicarbonate/cit ac 50 mEq   1933    Discharged         Headache risk factors:   H/o TBI  - 2010 [facility] with concussion, 2015 fell off kasia   H/o Meningitis  - no   F/h Aneurysms  - no     Review of Systems   Constitutional: Negative.    HENT: Negative.    Eyes: Negative.    Respiratory: Negative.    Cardiovascular: Negative.    Gastrointestinal: Negative.    Endocrine: Negative.    Genitourinary: Negative.    Musculoskeletal: Negative.    Integumentary:  Negative.   Allergic/Immunologic: Negative.    Neurological: Positive for headaches.   Hematological: Negative.    Psychiatric/Behavioral: Positive for sleep disturbance.          Objective:   Physical Exam  Constitutional:       Appearance: Normal appearance.      Psychiatric:         Mood and Affect: Mood normal.         Behavior: Behavior normal.         Thought Content: Thought content normal.         Judgment: Judgment normal.          Headache Exam:  Optic disc is flat OU   Temples appear symmetric  No V1,V2,V3 distribution allodynia/hyperalgesia adrien   No facial swelling  No gross TMJ abnormalities   No ptosis   No conj injection   No meningismus   No neck stiffness  No C spine tenderness   Cervical facet tenderness on palpation adrien    tender points over trapezium   adrien  supraorbital nerve exit point tenderness  adrien  occipital nerve exit point tenderness  No auriculotemporal nerve tenderness      Limited ROM neck   Assessment:   1, Migraine - chronic        MR Impression:   No acute intracranial abnormality.  No findings to suggest dural venous sinus thrombosis.     Electronically signed by resident: Betty Ruiz  Date:                                            09/18/2021  Time:                                           18:03     Electronically signed by: John Blake MD  Date:                                             09/18/2021  Time:                                           18:24        MRI FINDINGS:  Alignment: Straightening of the normal cervical lordosis.     Vertebrae: No diffuse marrow signal abnormality to suggest infiltrative process.  No evidence of acute fractures.  Vertebral body heights are well maintained.     Discs: Disc heights are well maintained with normal disc signal.     Cord: Normal contour and signal.     Skull base and craniocervical junction: Normal.     Degenerative findings:   C2-C3: No significant spinal canal stenosis or neural foraminal narrowing.   C3-C4: No significant spinal canal stenosis or neural foraminal narrowing.   C4-C5: No significant spinal canal stenosis or neural foraminal narrowing.   C5-C6: No significant spinal canal stenosis or neural foraminal narrowing.   C6-C7: No significant spinal canal stenosis or neural foraminal narrowing.   C7-T1: No significant spinal canal stenosis or neural foraminal narrowing.   T1-T2: No significant spinal canal stenosis or neural foraminal narrowing.   Paraspinal muscles & soft tissues: Normal.     Impression:   No significant abnormality identified.     Electronically signed by resident: Matthew Preston  Date:                                            10/23/2021  Time:                                           13:03     Electronically signed by: Humphrey Jordan  Date:                                            10/23/2021  Time:                                           14:05        Plan:   1. Cont BOTOX   Emgality (Nov 2021-)   2, Breakthrough headache - tylenol, reyvow, zofran   Multiple alternative treatment options were offered to the patient  3. Occipital neuralgia - If medical management is ineffective may consider occipital nerve blocks.   4. I urged the patient to keep a headache calender.      Procedures    BOTOX treatment response:   Prior to initiating BOTOX, the patient had   25 migraine days per month on average. This meets criteria  for chronic migraine.   After starting BOTOX, the patient experienced a reduction in  15  days per month   After starting BOTOX, the patient experienced a reduction in > 100 hours of migraine symptoms per month   After starting BOTOX, the patient experienced a 50% reduction in burden of migraine days per month   Based on this information, BOTOX is medically necessary for the management of the patient's chronic migraine.     BOTOX Injection intervals:   Patient reports a 'wearing off effect' prior to the subsequent BOTOX injections at 12 weeks. This occurs at week 10  Symptoms of this a 'wearing off effect' include - worsening of migraine headache frequency and intensity   Medications used during the 'wearing off effect' period include advil  Based on this information, it is medically necessary for the patient to receive BOTOX therapy at an interval of every 10 weeks for the management of chronic migraine.    BOTOX was performed as an indicated therapy for intractable chronic migraine headaches given that the patient failed > 3  prophylactic medications    Botulinum Toxin Injection Procedure   Pre-operative diagnosis: Chronic migraine   Post-operative diagnosis: Same   Procedure: Chemical neurolysis   After risks and benefits were explained including bleeding, infection, worsening of pain, damage to the areas being injected, weakness of muscles, loss of muscle control, dysphagia if injecting the head or neck, facial droop if injecting the facial area, painful injection, allergic or other reaction to the medications being injected, and the failure of the procedure to help the problem, a signed consent was obtained.   The patient was placed in a comfortable area and the sites to be treated were identified.The area to be treated was prepped three times with alcohol and the alcohol allowed to dry. Next, a 30 gauge needle was used to inject the medication in the area to be treated.   Area(s) injected:   Total Botox used:  155 Units   Botox wastage: 45 Units   Injection sites:    muscle bilaterally ( a total of 10 units divided into 2 sites)   Procerus muscle (5 units)   Frontalis muscle bilaterally (a total of 20 units divided into 4 sites)   Temporalis muscle bilaterally (a total of 40 units divided into 8 sites)   Occipitalis muscle bilaterally (a total of 30 units divided into 6 sites)   Cervical paraspinal muscles (a total of 20 units divided into 4 sites)   Trapezius muscle bilaterally (a total of 30 units divided into 6 sites)   Complications: none   RTC for the next Botox injection: 10 weeks

## 2024-03-01 ENCOUNTER — TELEPHONE (OUTPATIENT)
Dept: NEUROLOGY | Facility: CLINIC | Age: 24
End: 2024-03-01
Payer: COMMERCIAL

## 2024-03-03 ENCOUNTER — PATIENT MESSAGE (OUTPATIENT)
Dept: OBSTETRICS AND GYNECOLOGY | Facility: CLINIC | Age: 24
End: 2024-03-03
Payer: COMMERCIAL

## 2024-03-08 ENCOUNTER — HOSPITAL ENCOUNTER (OUTPATIENT)
Facility: HOSPITAL | Age: 24
Discharge: HOME | End: 2024-03-10
Attending: STUDENT IN AN ORGANIZED HEALTH CARE EDUCATION/TRAINING PROGRAM | Admitting: STUDENT IN AN ORGANIZED HEALTH CARE EDUCATION/TRAINING PROGRAM
Payer: COMMERCIAL

## 2024-03-08 ENCOUNTER — OFFICE VISIT (OUTPATIENT)
Dept: OBSTETRICS AND GYNECOLOGY | Facility: CLINIC | Age: 24
End: 2024-03-08
Payer: COMMERCIAL

## 2024-03-08 DIAGNOSIS — K35.30 ACUTE APPENDICITIS WITH LOCALIZED PERITONITIS WITHOUT GANGRENE, UNSPECIFIED WHETHER ABSCESS PRESENT, UNSPECIFIED WHETHER PERFORATION PRESENT: ICD-10-CM

## 2024-03-08 DIAGNOSIS — Z30.09 ENCOUNTER FOR COUNSELING REGARDING CONTRACEPTION: Primary | ICD-10-CM

## 2024-03-08 DIAGNOSIS — K37 APPENDICITIS: Primary | ICD-10-CM

## 2024-03-08 LAB
ALBUMIN SERPL BCP-MCNC: 4 G/DL (ref 3.5–5.2)
ALP SERPL-CCNC: 63 U/L (ref 55–135)
ALT SERPL W/O P-5'-P-CCNC: 14 U/L (ref 10–44)
ANION GAP SERPL CALC-SCNC: 10 MMOL/L (ref 8–16)
AST SERPL-CCNC: 18 U/L (ref 10–40)
B-HCG UR QL: NEGATIVE
BASOPHILS # BLD AUTO: 0.04 K/UL (ref 0–0.2)
BASOPHILS NFR BLD: 0.5 % (ref 0–1.9)
BILIRUB SERPL-MCNC: 1.2 MG/DL (ref 0.1–1)
BILIRUB UR QL STRIP: NEGATIVE
BUN SERPL-MCNC: 9 MG/DL (ref 6–20)
BUN SERPL-MCNC: 9 MG/DL (ref 6–30)
CALCIUM SERPL-MCNC: 9.1 MG/DL (ref 8.7–10.5)
CHLORIDE SERPL-SCNC: 103 MMOL/L (ref 95–110)
CHLORIDE SERPL-SCNC: 107 MMOL/L (ref 95–110)
CLARITY UR REFRACT.AUTO: CLEAR
CO2 SERPL-SCNC: 21 MMOL/L (ref 23–29)
COLOR UR AUTO: COLORLESS
CREAT SERPL-MCNC: 0.6 MG/DL (ref 0.5–1.4)
CREAT SERPL-MCNC: 0.7 MG/DL (ref 0.5–1.4)
CTP QC/QA: YES
DIFFERENTIAL METHOD BLD: NORMAL
EOSINOPHIL # BLD AUTO: 0.1 K/UL (ref 0–0.5)
EOSINOPHIL NFR BLD: 0.9 % (ref 0–8)
ERYTHROCYTE [DISTWIDTH] IN BLOOD BY AUTOMATED COUNT: 12.1 % (ref 11.5–14.5)
EST. GFR  (NO RACE VARIABLE): >60 ML/MIN/1.73 M^2
GLUCOSE SERPL-MCNC: 70 MG/DL (ref 70–110)
GLUCOSE SERPL-MCNC: 71 MG/DL (ref 70–110)
GLUCOSE UR QL STRIP: NEGATIVE
HCT VFR BLD AUTO: 38.8 % (ref 37–48.5)
HCT VFR BLD CALC: 37 %PCV (ref 36–54)
HCV AB SERPL QL IA: NORMAL
HGB BLD-MCNC: 13 G/DL (ref 12–16)
HGB UR QL STRIP: NEGATIVE
HIV 1+2 AB+HIV1 P24 AG SERPL QL IA: NORMAL
IMM GRANULOCYTES # BLD AUTO: 0.02 K/UL (ref 0–0.04)
IMM GRANULOCYTES NFR BLD AUTO: 0.3 % (ref 0–0.5)
KETONES UR QL STRIP: ABNORMAL
LEUKOCYTE ESTERASE UR QL STRIP: NEGATIVE
LIPASE SERPL-CCNC: 16 U/L (ref 4–60)
LYMPHOCYTES # BLD AUTO: 3 K/UL (ref 1–4.8)
LYMPHOCYTES NFR BLD: 37.9 % (ref 18–48)
MCH RBC QN AUTO: 30.7 PG (ref 27–31)
MCHC RBC AUTO-ENTMCNC: 33.5 G/DL (ref 32–36)
MCV RBC AUTO: 92 FL (ref 82–98)
MONOCYTES # BLD AUTO: 0.6 K/UL (ref 0.3–1)
MONOCYTES NFR BLD: 7.7 % (ref 4–15)
NEUTROPHILS # BLD AUTO: 4.1 K/UL (ref 1.8–7.7)
NEUTROPHILS NFR BLD: 52.7 % (ref 38–73)
NITRITE UR QL STRIP: NEGATIVE
NRBC BLD-RTO: 0 /100 WBC
PH UR STRIP: 6 [PH] (ref 5–8)
PLATELET # BLD AUTO: 260 K/UL (ref 150–450)
PMV BLD AUTO: 10 FL (ref 9.2–12.9)
POC IONIZED CALCIUM: 1.18 MMOL/L (ref 1.06–1.42)
POC TCO2 (MEASURED): 26 MMOL/L (ref 23–29)
POTASSIUM BLD-SCNC: 3.8 MMOL/L (ref 3.5–5.1)
POTASSIUM SERPL-SCNC: 3.8 MMOL/L (ref 3.5–5.1)
PROT SERPL-MCNC: 7 G/DL (ref 6–8.4)
PROT UR QL STRIP: NEGATIVE
RBC # BLD AUTO: 4.24 M/UL (ref 4–5.4)
SAMPLE: NORMAL
SODIUM BLD-SCNC: 138 MMOL/L (ref 136–145)
SODIUM SERPL-SCNC: 138 MMOL/L (ref 136–145)
SP GR UR STRIP: 1.01 (ref 1–1.03)
URN SPEC COLLECT METH UR: ABNORMAL
WBC # BLD AUTO: 7.83 K/UL (ref 3.9–12.7)

## 2024-03-08 PROCEDURE — 99285 EMERGENCY DEPT VISIT HI MDM: CPT | Mod: 25

## 2024-03-08 PROCEDURE — 81003 URINALYSIS AUTO W/O SCOPE: CPT | Performed by: EMERGENCY MEDICINE

## 2024-03-08 PROCEDURE — 80047 BASIC METABLC PNL IONIZED CA: CPT | Mod: 91

## 2024-03-08 PROCEDURE — 99212 OFFICE O/P EST SF 10 MIN: CPT | Mod: 95,,, | Performed by: STUDENT IN AN ORGANIZED HEALTH CARE EDUCATION/TRAINING PROGRAM

## 2024-03-08 PROCEDURE — 85025 COMPLETE CBC W/AUTO DIFF WBC: CPT | Performed by: EMERGENCY MEDICINE

## 2024-03-08 PROCEDURE — 25500020 PHARM REV CODE 255: Performed by: STUDENT IN AN ORGANIZED HEALTH CARE EDUCATION/TRAINING PROGRAM

## 2024-03-08 PROCEDURE — 83690 ASSAY OF LIPASE: CPT | Performed by: EMERGENCY MEDICINE

## 2024-03-08 PROCEDURE — 63600175 PHARM REV CODE 636 W HCPCS: Performed by: STUDENT IN AN ORGANIZED HEALTH CARE EDUCATION/TRAINING PROGRAM

## 2024-03-08 PROCEDURE — 80053 COMPREHEN METABOLIC PANEL: CPT | Performed by: EMERGENCY MEDICINE

## 2024-03-08 PROCEDURE — 81025 URINE PREGNANCY TEST: CPT | Performed by: EMERGENCY MEDICINE

## 2024-03-08 PROCEDURE — 96361 HYDRATE IV INFUSION ADD-ON: CPT

## 2024-03-08 PROCEDURE — 87389 HIV-1 AG W/HIV-1&-2 AB AG IA: CPT | Performed by: PHYSICIAN ASSISTANT

## 2024-03-08 PROCEDURE — 86803 HEPATITIS C AB TEST: CPT | Performed by: PHYSICIAN ASSISTANT

## 2024-03-08 PROCEDURE — 96375 TX/PRO/DX INJ NEW DRUG ADDON: CPT

## 2024-03-08 RX ORDER — KETOROLAC TROMETHAMINE 30 MG/ML
10 INJECTION, SOLUTION INTRAMUSCULAR; INTRAVENOUS
Status: COMPLETED | OUTPATIENT
Start: 2024-03-08 | End: 2024-03-08

## 2024-03-08 RX ADMIN — IOHEXOL 75 ML: 350 INJECTION, SOLUTION INTRAVENOUS at 10:03

## 2024-03-08 RX ADMIN — KETOROLAC TROMETHAMINE 10 MG: 30 INJECTION, SOLUTION INTRAMUSCULAR; INTRAVENOUS at 09:03

## 2024-03-08 RX ADMIN — SODIUM CHLORIDE, POTASSIUM CHLORIDE, SODIUM LACTATE AND CALCIUM CHLORIDE 1000 ML: 600; 310; 30; 20 INJECTION, SOLUTION INTRAVENOUS at 09:03

## 2024-03-08 NOTE — PROGRESS NOTES
The chief complaint leading to consultation is: OCPs    Visit type: audiovisual    Face to Face time with patient: approximately 10 minutes  16 minutes of total time spent on the encounter, which includes face to face time and non-face to face time preparing to see the patient (eg, review of tests), Obtaining and/or reviewing separately obtained history, Documenting clinical information in the electronic or other health record, Independently interpreting results (not separately reported) and communicating results to the patient/family/caregiver, or Care coordination (not separately reported).     Each patient to whom he or she provides medical services by telemedicine is:  (1) informed of the relationship between the physician and patient and the respective role of any other health care provider with respect to management of the patient; and (2) notified that he or she may decline to receive medical services by telemedicine and may withdraw from such care at any time.    Notes:     Chief Complaint: BC     HPI:      Rimma Menezes is a 23 y.o.  who presents today for possible OCP initiation.  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 Interested in the pill. Menses are regular. Lasting 5 days. LMP 3/3. This past period, patient passed out and had associated emesis. First thought was related to the menses. However, nausea and emesis has persisted and menses has ended. Nausea, emesis x 2 days now. Endorses abdominal discomfort. No rhinorrhea, nasal congestion, sore throat, or flu-like symptoms. Denies constipation but diarrhea today. No fevers or chills. No sick contacts. No intercourse, denies possibility of pregnancy. No additional complaints.    Physical Exam:     APPEARANCE: Well nourished, well developed, in no acute distress. Mood and affect appropriate.  RESP: No respiratory distress appreciated. Speaking comfortably in full sentences.     Results:     N/A    Assessment/Plan:     Encounter for counseling  regarding contraception    - Rec urgent care / PCP evaluation  Ddx appendicitis, gastroenteritis, cystitis, nephrolithiasis, pyelo, dysmenorrhea  - To keep me posted  - To let me know after evaluation if interested in re-starting OCPs

## 2024-03-09 ENCOUNTER — ANESTHESIA (OUTPATIENT)
Dept: SURGERY | Facility: HOSPITAL | Age: 24
End: 2024-03-09
Payer: COMMERCIAL

## 2024-03-09 ENCOUNTER — ANESTHESIA EVENT (OUTPATIENT)
Dept: SURGERY | Facility: HOSPITAL | Age: 24
End: 2024-03-09
Payer: COMMERCIAL

## 2024-03-09 PROBLEM — K37 APPENDICITIS: Status: ACTIVE | Noted: 2024-03-09

## 2024-03-09 PROCEDURE — 63600175 PHARM REV CODE 636 W HCPCS: Performed by: STUDENT IN AN ORGANIZED HEALTH CARE EDUCATION/TRAINING PROGRAM

## 2024-03-09 PROCEDURE — G0378 HOSPITAL OBSERVATION PER HR: HCPCS

## 2024-03-09 PROCEDURE — 25000003 PHARM REV CODE 250: Performed by: STUDENT IN AN ORGANIZED HEALTH CARE EDUCATION/TRAINING PROGRAM

## 2024-03-09 PROCEDURE — 25000003 PHARM REV CODE 250

## 2024-03-09 PROCEDURE — 96361 HYDRATE IV INFUSION ADD-ON: CPT

## 2024-03-09 PROCEDURE — 94761 N-INVAS EAR/PLS OXIMETRY MLT: CPT

## 2024-03-09 PROCEDURE — 96365 THER/PROPH/DIAG IV INF INIT: CPT

## 2024-03-09 PROCEDURE — 96366 THER/PROPH/DIAG IV INF ADDON: CPT

## 2024-03-09 RX ORDER — PROCHLORPERAZINE EDISYLATE 5 MG/ML
2.5 INJECTION INTRAMUSCULAR; INTRAVENOUS EVERY 6 HOURS PRN
Status: DISCONTINUED | OUTPATIENT
Start: 2024-03-09 | End: 2024-03-10 | Stop reason: HOSPADM

## 2024-03-09 RX ORDER — MORPHINE SULFATE 4 MG/ML
4 INJECTION, SOLUTION INTRAMUSCULAR; INTRAVENOUS EVERY 4 HOURS PRN
Status: DISCONTINUED | OUTPATIENT
Start: 2024-03-09 | End: 2024-03-10 | Stop reason: HOSPADM

## 2024-03-09 RX ORDER — MORPHINE SULFATE 2 MG/ML
2 INJECTION, SOLUTION INTRAMUSCULAR; INTRAVENOUS EVERY 4 HOURS PRN
Status: DISCONTINUED | OUTPATIENT
Start: 2024-03-09 | End: 2024-03-10 | Stop reason: HOSPADM

## 2024-03-09 RX ORDER — SODIUM CHLORIDE 9 MG/ML
INJECTION, SOLUTION INTRAVENOUS CONTINUOUS
Status: DISCONTINUED | OUTPATIENT
Start: 2024-03-09 | End: 2024-03-09

## 2024-03-09 RX ORDER — SODIUM CHLORIDE, SODIUM LACTATE, POTASSIUM CHLORIDE, CALCIUM CHLORIDE 600; 310; 30; 20 MG/100ML; MG/100ML; MG/100ML; MG/100ML
INJECTION, SOLUTION INTRAVENOUS CONTINUOUS
Status: DISCONTINUED | OUTPATIENT
Start: 2024-03-10 | End: 2024-03-09

## 2024-03-09 RX ORDER — ONDANSETRON 8 MG/1
8 TABLET, ORALLY DISINTEGRATING ORAL EVERY 8 HOURS PRN
Status: DISCONTINUED | OUTPATIENT
Start: 2024-03-09 | End: 2024-03-10 | Stop reason: HOSPADM

## 2024-03-09 RX ORDER — SODIUM CHLORIDE 9 MG/ML
INJECTION, SOLUTION INTRAVENOUS CONTINUOUS
Status: DISCONTINUED | OUTPATIENT
Start: 2024-03-10 | End: 2024-03-10

## 2024-03-09 RX ORDER — LIDOCAINE HYDROCHLORIDE 10 MG/ML
1 INJECTION, SOLUTION EPIDURAL; INFILTRATION; INTRACAUDAL; PERINEURAL ONCE AS NEEDED
Status: DISCONTINUED | OUTPATIENT
Start: 2024-03-09 | End: 2024-03-10 | Stop reason: HOSPADM

## 2024-03-09 RX ORDER — SODIUM CHLORIDE 0.9 % (FLUSH) 0.9 %
10 SYRINGE (ML) INJECTION
Status: DISCONTINUED | OUTPATIENT
Start: 2024-03-09 | End: 2024-03-10 | Stop reason: HOSPADM

## 2024-03-09 RX ORDER — ACETAMINOPHEN 325 MG/1
650 TABLET ORAL EVERY 6 HOURS
Status: DISCONTINUED | OUTPATIENT
Start: 2024-03-09 | End: 2024-03-10 | Stop reason: HOSPADM

## 2024-03-09 RX ORDER — SODIUM CHLORIDE, SODIUM LACTATE, POTASSIUM CHLORIDE, CALCIUM CHLORIDE 600; 310; 30; 20 MG/100ML; MG/100ML; MG/100ML; MG/100ML
INJECTION, SOLUTION INTRAVENOUS CONTINUOUS
Status: DISCONTINUED | OUTPATIENT
Start: 2024-03-09 | End: 2024-03-09

## 2024-03-09 RX ADMIN — SODIUM CHLORIDE: 9 INJECTION, SOLUTION INTRAVENOUS at 10:03

## 2024-03-09 RX ADMIN — ACETAMINOPHEN 650 MG: 325 TABLET ORAL at 11:03

## 2024-03-09 RX ADMIN — SODIUM CHLORIDE, POTASSIUM CHLORIDE, SODIUM LACTATE AND CALCIUM CHLORIDE: 600; 310; 30; 20 INJECTION, SOLUTION INTRAVENOUS at 08:03

## 2024-03-09 RX ADMIN — SODIUM CHLORIDE, POTASSIUM CHLORIDE, SODIUM LACTATE AND CALCIUM CHLORIDE: 600; 310; 30; 20 INJECTION, SOLUTION INTRAVENOUS at 07:03

## 2024-03-09 RX ADMIN — ACETAMINOPHEN 650 MG: 325 TABLET ORAL at 06:03

## 2024-03-09 RX ADMIN — SODIUM CHLORIDE, POTASSIUM CHLORIDE, SODIUM LACTATE AND CALCIUM CHLORIDE 1000 ML: 600; 310; 30; 20 INJECTION, SOLUTION INTRAVENOUS at 01:03

## 2024-03-09 RX ADMIN — PIPERACILLIN SODIUM AND TAZOBACTAM SODIUM 4.5 G: 4; .5 INJECTION, POWDER, FOR SOLUTION INTRAVENOUS at 02:03

## 2024-03-09 RX ADMIN — ONDANSETRON 8 MG: 8 TABLET, ORALLY DISINTEGRATING ORAL at 06:03

## 2024-03-09 RX ADMIN — PIPERACILLIN SODIUM AND TAZOBACTAM SODIUM 4.5 G: 4; .5 INJECTION, POWDER, FOR SOLUTION INTRAVENOUS at 10:03

## 2024-03-09 RX ADMIN — PIPERACILLIN SODIUM AND TAZOBACTAM SODIUM 4.5 G: 4; .5 INJECTION, POWDER, FOR SOLUTION INTRAVENOUS at 06:03

## 2024-03-09 NOTE — CONSULTS
GENERAL SURGERY  Consultation      REASON FOR CONSULT:  appendicitis      SUBJECTIVE:     HISTORY OF PRESENT ILLNESS:  Rimma Menezes is a 23 y.o. healthy female presents to the ED with 5-days of nausea and 1-day of periumbilical abdominal pain. She was seen by her PCP, prompting ED evaluation. She notes no oral intake today, due to the nausea and pain. No changes to her bowel movements. No prior occurrence of similar pain. No prior intra-abdominal surgeries, she has had a hip surgery before. No prior MI/stroke/DVT, not on anticoagulation.     In ED, she is hemodynamically stable with a normal WBC. CT A/P with a borderline normal appendix, questionable wall thickening with a 2 mm appendicolith. No jem-appendiceal inflammation.     General Surgery has been consulted for evaluation for appendicitis.      MEDICATIONS:  Home Medications:  Current Facility-Administered Medications on File Prior to Encounter   Medication Dose Route Frequency Provider Last Rate Last Admin    onabotulinumtoxina injection 200 Units  200 Units Intramuscular Q90 Days Galen Dailey MD   200 Units at 09/12/23 1703    onabotulinumtoxina injection 200 Units  200 Units Intramuscular q12 weeks Galen Dailey MD   200 Units at 02/08/24 1442     Current Outpatient Medications on File Prior to Encounter   Medication Sig Dispense Refill    amitriptyline (ELAVIL) 10 MG tablet Amytriptyline/baclofen/gabpenten/lidocaine please convert to Vulvodynia cream Sig pea sized amount vaginally up to three times a day as needed (Patient not taking: Reported on 2/8/2024) 30 tablet 2    EPIDUO FORTE 0.3-2.5 % GlwP Apply 1 application topically every evening.      galcanezumab-gnlm (EMGALITY PEN) 120 mg/mL PnIj Inject 120 mg into the skin every 28 days. 1 mL 11    galcanezumab-gnlm (EMGALITY PEN) 120 mg/mL PnIj Inject 1 mL (120 mg total) into the skin every 28 days. 1 mL 11    norethindrone-e.estradioL-iron (MIBELAS 24 FE) 1 mg-20 mcg(24) /75 mg (4) Chew Take 1  tablet by mouth once daily. (Patient not taking: Reported on 12/7/2023) 84 tablet 3    vortioxetine (TRINTELLIX) 10 mg Tab Take 1 tablet (10 mg total) by mouth once daily. (Patient not taking: Reported on 12/7/2023) 30 tablet 12     Inpatient Medications:   acetaminophen  650 mg Oral Q6H    onabotulinumtoxina  200 Units Intramuscular Q90 Days    onabotulinumtoxina  200 Units Intramuscular q12 weeks    piperacillin-tazobactam (Zosyn) IV (PEDS and ADULTS) (extended infusion is not appropriate)  4.5 g Intravenous Q8H     Infusions:   lactated ringers       PRN Medications:LIDOcaine (PF) 10 mg/ml (1%), morphine, morphine, ondansetron, sodium chloride 0.9%    ALLERGIES:    Review of patient's allergies indicates:  No Known Allergies    PAST MEDICAL HISTORY:    Past Medical History:   Diagnosis Date    Anxiety     Borderline diabetes     Depression     Migraine     Polycystic ovary        SURGICAL HISTORY:  Past Surgical History:   Procedure Laterality Date    HIP SURGERY         FAMILY HISTORY:  Family History   Problem Relation Age of Onset    Breast cancer Paternal Aunt     Brain cancer Maternal Grandfather     Colon cancer Neg Hx     Ovarian cancer Neg Hx     Uterine cancer Neg Hx     Melanoma Neg Hx     Pancreatic cancer Neg Hx     Prostate cancer Neg Hx        SOCIAL HISTORY:  Social History     Tobacco Use    Smoking status: Never    Smokeless tobacco: Never   Substance Use Topics    Alcohol use: Yes     Comment: once a week    Drug use: Never        REVIEW OF SYSTEMS:  A 10-point review of systems is negative except for the above mentioned in the HPI.    OBJECTIVE:     Most Recent Vitals:  Temp: 98 °F (36.7 °C) (03/09/24 0343)  Pulse: 90 (03/09/24 0343)  Resp: 15 (03/08/24 2036)  BP: 110/72 (03/09/24 0343)  SpO2: 99 % (03/09/24 0343)    24-Hour Vitals:  Temp:  [98 °F (36.7 °C)-98.5 °F (36.9 °C)]   Pulse:  [82-90]   Resp:  [15-18]   BP: (103-110)/(58-72)   SpO2:  [99 %-100 %]     24-Hour I&O:  Intake/Output Summary  "(Last 24 hours) at 3/9/2024 8966  Last data filed at 3/9/2024 0312  Gross per 24 hour   Intake 1998 ml   Output --   Net 1998 ml       PHYSICAL EXAM:  AAO, NAD, well developed and well nourished.  Head normocephalic, atraumatic.  Trachea midline, neck supple.  Respirations unlabored with good inspiratory effort.  Heart regular rate and rhythm.  Abdomen soft, nondistended, tenderness to RLQ     No peritoneal signs       LABORATORY VALUES:  Recent Labs   Lab 03/08/24 2033 03/08/24 2040   WBC 7.83  --    HGB 13.0  --    HCT 38.8 37     --      Recent Labs   Lab 03/08/24 2033      K 3.8      CO2 21*   BUN 9   CREATININE 0.7   GLU 70   CALCIUM 9.1   AST 18   ALT 14   ALKPHOS 63   BILITOT 1.2*   PROT 7.0   ALBUMIN 4.0   LIPASE 16   No results for input(s): "INR", "PTT", "LABHEPA", "LACTATE", "TROPONINI", "CPK", "CPKMB", "MB", "BNP" in the last 72 hours.No results for input(s): "PH", "PCO2", "PO2", "HCO3" in the last 72 hours.    DIAGNOSTIC STUDIES:  CT: Reviewed    ASSESSMENT:     Rimma Menezes is a 23 y.o. female admitted to [facility] on 3/8/2024 for Appendicitis. CT with equivocal findings for appendicitis - the appendix is not inflammed/distended, however there is some questionable wall thickening and a 2 mm appendicolith. Given her physical exam, there is a moderate suspicion for acute appendicitis. Will admit to observation, initiate IV antibiotics and likely add on today for a laparoscopic appendectomy.       PLAN:  Admit to general surgery  IV abx  NPO  mIVF  Laparoscopic appendectomy on 3/9  DVT PPX          -- Alexus Jones M.D  General Surgery PGY5  Pager: [phone]        "

## 2024-03-09 NOTE — PLAN OF CARE
[facility]  Initial Discharge Assessment       Primary Care Provider: Jorge Ugalde MD    Admission Diagnosis: Appendicitis [K37]  Acute appendicitis with localized peritonitis without gangrene, unspecified whether abscess present, unspecified whether perforation present [K35.30]    Admission Date: 3/8/2024  Expected Discharge Date:     Transition of Care Barriers: None    Payor: BLUE CROSS BLUE SHIELD / Plan: BCBS BLUE SAVER PPO - HD / Product Type: PPO /     Extended Emergency Contact Information  Primary Emergency Contact: TamekaMelba  Mobile Phone: [phone]  Relation: Mother   needed? No    Discharge Plan A: Home with family  Discharge Plan B: Home Health      [id number] - [place], [place] - [address] [facility] [address]  [address]  [facility] [address]  Phone: [phone] Fax: [phone]    [facility] - [place], [place] - [address]  [address]  [address]  [facility] [address]  Phone: [phone] Fax: [phone]    [facility] - [place], [place] - [address]  [address] [address] [facility] [address]  [facility] [address]  Phone: [phone] Fax: [phone]      Initial Assessment (most recent)       Adult Discharge Assessment - 03/09/24 1136          Discharge Assessment    Assessment Type Discharge Planning Assessment     Confirmed/corrected address, phone number and insurance Yes     Confirmed Demographics Correct on Facesheet     Source of Information patient     Communicated JORGE with patient/caregiver Yes     People in Home friend(s)     Do you expect to return to your current living situation? Yes     Do you have help at home or someone to help you manage your care at home? Yes     Who are your caregiver(s) and their phone number(s)? mother     Prior to hospitilization cognitive status: Alert/Oriented     Current cognitive status: Alert/Oriented     Home Accessibility  wheelchair accessible     Home Layout Able to live on 1st floor     Do you take prescription medications? Yes     Do you have prescription coverage? Yes     Do you have any problems affording any of your prescribed medications? No     Is the patient taking medications as prescribed? yes     Who is going to help you get home at discharge? mother     How do you get to doctors appointments? car, drives self     Are you on dialysis? No     Do you take coumadin? No     Discharge Plan A Home with family     Discharge Plan B Home Health     Discharge Plan discussed with: Patient     Transition of Care Barriers None     SDOH --   no       Physical Activity    On average, how many days per week do you engage in moderate to strenuous exercise (like a brisk walk)? 0 days     On average, how many minutes do you engage in exercise at this level? 0 min        Financial Resource Strain    How hard is it for you to pay for the very basics like food, housing, medical care, and heating? Not hard at all        Housing Stability    In the last 12 months, was there a time when you were not able to pay the mortgage or rent on time? No     In the last 12 months, was there a time when you did not have a steady place to sleep or slept in a shelter (including now)? No        Transportation Needs    In the past 12 months, has lack of transportation kept you from medical appointments or from getting medications? No     In the past 12 months, has lack of transportation kept you from meetings, work, or from getting things needed for daily living? No        Food Insecurity    Within the past 12 months, you worried that your food would run out before you got the money to buy more. Never true     Within the past 12 months, the food you bought just didn't last and you didn't have money to get more. Never true        Stress    Do you feel stress - tense, restless, nervous, or anxious, or unable to sleep at night because your mind is troubled all the  time - these days? Not at all        Social Connections    In a typical week, how many times do you talk on the phone with family, friends, or neighbors? Three times a week     How often do you get together with friends or relatives? Three times a week     How often do you attend Pentecostal or Baptist services? More than 4 times per year     Do you belong to any clubs or organizations such as Pentecostal groups, unions, fraternal or athletic groups, or school groups? No     How often do you attend meetings of the clubs or organizations you belong to? 1 to 4 times per year     Are you , , , , never , or living with a partner? Never         Alcohol Use    Q1: How often do you have a drink containing alcohol? Never     Q2: How many drinks containing alcohol do you have on a typical day when you are drinking? Patient does not drink     Q3: How often do you have six or more drinks on one occasion? Never                      The CM met with the patient at bedside to complete the DPA.  The CM placed name and contact information on the blackboard in the patient's room.  Use preferred pharmacy / bedside delivery for any necessary  medications at the time of discharge.The patient is independent with all ADLs. The patient is not on Dialysis or Coumadin. The patient's mother will provide assistance to the patient upon discharge. The patient's mother will provide transportation upon discharge .  The CM will continue to follow for course of hospitalization.

## 2024-03-09 NOTE — ED PROVIDER NOTES
Encounter Date: 3/8/2024       History     Chief Complaint   Patient presents with    Vomiting     Nausea, dizziness, vomiting x 1 week., RLQ abd pain. Sent by  for rule out appendicitis     23 y.o. female with anxiety, depression, history of migraines, polycystic ovary presents for vomiting and abdominal pain.  Patient reports approximately 1 week of dizziness and nausea/vomiting.  She had attributed to her menstrual cycle at the time, however she has had gradual radiation of her pain to the right lower quadrant of the abdomen.  The pain is severe.  She denies any known fever/chills.  Her dizziness is present when she stands up.  Denies any vaginal bleeding, vaginal discharge, dysuria      The history is provided by the patient and medical records.     Review of patient's allergies indicates:  No Known Allergies  Past Medical History:   Diagnosis Date    Anxiety     Borderline diabetes     Depression     Migraine     Polycystic ovary      Past Surgical History:   Procedure Laterality Date    HIP SURGERY      LAPAROSCOPIC APPENDECTOMY N/A 3/10/2024    Procedure: APPENDECTOMY, LAPAROSCOPIC;  Surgeon: Lj Yang MD;  Location: [facility] OR [address] [place] [address];  Service: General;  Laterality: N/A;     Family History   Problem Relation Age of Onset    Breast cancer Paternal Aunt     Brain cancer Maternal Grandfather     Colon cancer Neg Hx     Ovarian cancer Neg Hx     Uterine cancer Neg Hx     Melanoma Neg Hx     Pancreatic cancer Neg Hx     Prostate cancer Neg Hx      Social History     Tobacco Use    Smoking status: Never    Smokeless tobacco: Never   Substance Use Topics    Alcohol use: Yes     Comment: once a week    Drug use: Never     Review of Systems   Reason unable to perform ROS: See HPI for relevant ROS.       Physical Exam     Initial Vitals [03/08/24 1840]   BP Pulse Resp Temp SpO2   (!) 110/58 87 18 98.5 °F (36.9 °C) 99 %      MAP       --         Physical Exam    Nursing note and vitals  reviewed.  Constitutional:   Alert, normal work of breathing, no acute distress   Eyes: Conjunctivae are normal. No scleral icterus.   Cardiovascular:  Normal rate, regular rhythm and intact distal pulses.           Pulmonary/Chest: Breath sounds normal. No stridor. No respiratory distress.   Abdominal:   Abdomen soft and nondistended.  Right lower quadrant tenderness is present, negative Avendaño's   Musculoskeletal:         General: No edema.     Neurological: She is alert.   Skin: Skin is warm and dry.         ED Course   Procedures  Labs Reviewed   COMPREHENSIVE METABOLIC PANEL - Abnormal; Notable for the following components:       Result Value    CO2 21 (*)     Total Bilirubin 1.2 (*)     All other components within normal limits    Narrative:     Release to patient->Immediate   URINALYSIS, REFLEX TO URINE CULTURE - Abnormal; Notable for the following components:    Color, UA Colorless (*)     Ketones, UA Trace (*)     All other components within normal limits    Narrative:     Specimen Source->Urine   HIV 1 / 2 ANTIBODY    Narrative:     Release to patient->Immediate   HEPATITIS C ANTIBODY    Narrative:     Release to patient->Immediate   CBC W/ AUTO DIFFERENTIAL    Narrative:     Release to patient->Immediate   LIPASE    Narrative:     Release to patient->Immediate   POCT URINE PREGNANCY   ISTAT PROCEDURE          Imaging Results               CT Abdomen Pelvis With IV Contrast NO Oral Contrast (Final result)  Result time 03/09/24 00:56:15      Final result by Eliseo Porras MD (03/09/24 00:56:15)                   Impression:      Nondistended appendix with thickened, enhancing wall and a 2.0 mm appendicolith in the proximal appendix.  No adjacent inflammatory stranding.  Very early acute appendicitis a consideration.  Correlate clinically.  Consider short-term follow-up imaging if clinically appropriate.    A 1.8 x 1.7 x 1.7 cm low-attenuation lesion in the dorsal lateral aspect of the left lower urine  segment possibly represents a degenerating fibroid.  Acute fibroid degeneration can be associated with pain.  Correlate clinically.    This report was flagged in Epic as abnormal.    Electronically signed by resident: Denise Akers  Date:    03/08/2024  Time:    23:54    Electronically signed by: Eliseo Porras  Date:    03/09/2024  Time:    00:56               Narrative:    EXAMINATION:  CT ABDOMEN PELVIS WITH IV CONTRAST    CLINICAL HISTORY:  RLQ abdominal pain (Age >= 14y);    TECHNIQUE:  Low dose axial images were obtained from the lung bases to the pubic symphysis following the intravenous administration of 75 mL of Omnipaque 350.  Sagittal and coronal reformats were provided.    COMPARISON:  Hip and pelvic radiograph 02/21/2022    FINDINGS:  Heart: Visualized heart appears unremarkable.    Lung bases: Symmetrically expanded.  No consolidation, pleural effusion, mass, or pneumothorax.    Liver: Normal in size and attenuation without focal hepatic abnormality.    Gallbladder: Normal in appearance without evidence for cholecystitis.    Bile Ducts: No intra or extrahepatic biliary ductal dilation.    Pancreas: No pancreatic mass lesion or peripancreatic inflammatory change.    Spleen: Unremarkable.    Adrenals: Unremarkable.    Kidneys/ Ureters: Kidneys are normal in size and enhance symmetrically.  No nephrolithiasis or hydroureteronephrosis.    Bladder: Smooth contours without bladder wall thickening.    Reproductive organs: There is an approximately 1.8 x 1.7 x 1.7 cm oval, partially exophytic, hypodensity involving (and likely arising from) the dorsal lateral aspect of the lower uterine segment.  This could represent a fibroid, likely degenerating given its low attenuation appearance.    GI Tract/Mesentery: The stomach is unremarkable.  Visualized loops of small and large bowel are normal in caliber without evidence for obstruction or inflammation.    Nondistended appendix, demonstrating a thickened enhancing  wall and perhaps also a faintly radiopaque 2.0 mm appendicolith in the proximal appendix (series 601, image 81; series 602 image 72).  No adjacent inflammatory stranding.  No loculated fluid collections or intraperitoneal free air to suspect perforation or abscess.    No abdominopelvic ascites, intraperitoneal free air, or significant adenopathy.    Abdominal wall/extraperitoneal soft tissues: Unremarkable.    Vasculature: Abdominal aorta is normal in caliber, contour, and course without significant calcific atherosclerosis.    Bones: No acute displaced fracture.  Incidentally noted unfused right L1 transverse process.                                       Medications   ketorolac injection 9.999 mg (9.999 mg Intravenous Given 3/8/24 2129)   lactated ringers bolus 1,000 mL (0 mLs Intravenous Stopped 3/8/24 2238)   iohexoL (OMNIPAQUE 350) injection 75 mL (75 mLs Intravenous Given 3/8/24 2254)   lactated ringers bolus 1,000 mL (0 mLs Intravenous Stopped 3/9/24 0312)     Medical Decision Making  23 y.o. female with anxiety, depression, history of migraines, polycystic ovary presents for vomiting and abdominal pain.   Differentials include gastroenteritis, appendicitis, ovarian cyst, ovarian torsion, kidney stone  Patient presented with abdominal pain most prominent in the right lower quadrant with associated right lower quadrant tenderness.  Consistent with possible appendicitis.  Patient is otherwise alert, well-appearing, hemodynamically stable.  Abdomen is soft.  No significant urinary or pelvic symptoms  Treated symptomatically in the ED with some improvement in her symptoms.  CT shows findings concerning for possible early appendicitis, discussed with general surgery who evaluated patient and admitted to their service    Amount and/or Complexity of Data Reviewed  Radiology: ordered. Decision-making details documented in ED Course.    Risk  Prescription drug management.               ED Course as of 03/11/24 0525    Fri Mar 08, 2024   2045 CBC W/ AUTO DIFFERENTIAL [DS]   Sat Mar 09, 2024   0051 CT Abdomen Pelvis With IV Contrast NO Oral Contrast  Appendicolith and findings concerning for early appendicitis.  Uterine fibroid.  Discussed results with patient, will discussed with general surgery [OK]   0134 Discussed with general surgery who will evaluate [OK]      ED Course User Index  [DS] Mary Ellen, Michael MARQUEZ MD  [OK] Joshua Najera MD                           Clinical Impression:  Final diagnoses:  [K37] Appendicitis (Primary)          ED Disposition Condition    Observation                 Joshua Najera MD  03/11/24 0576

## 2024-03-09 NOTE — PROGRESS NOTES
Nurses Note -- 4 Eyes      3/9/2024   8:07 AM      Skin assessed during: Admit      [x] No Altered Skin Integrity Present    []Prevention Measures Documented      [] Yes- Altered Skin Integrity Present or Discovered   [] LDA Added if Not in Epic (Describe Wound)   [] New Altered Skin Integrity was Present on Admit and Documented in LDA   [] Wound Image Taken    Wound Care Consulted? No    Attending Nurse:  Aure Weathers RN/Staff Member:  BHANU [place]

## 2024-03-09 NOTE — ANESTHESIA PREPROCEDURE EVALUATION
[facility]  Anesthesia Pre-Operative Evaluation    Patient Name: Rimma Menezes  YOB: 2000  MRN: [id number]    SUBJECTIVE:     Pre-operative evaluation for Procedure(s) (LRB):  APPENDECTOMY, LAPAROSCOPIC (N/A)    03/09/2024    Rimma Menezes is a 23 y.o. female with depression, anxiety, and no documented cardiopulmonary history presented with 5 day history of nausea and abdominal pain.     Patient now presents for the above procedure(s).      2D ECHO:  TTE:  No results found for this or any previous visit.      BLADIMIR:  No results found for this or any previous visit.    LDA: None documented.       Peripheral IV - Single Lumen 03/08/24 20 G Right Antecubital (Active)   Site Assessment Clean;Dry;Intact 03/09/24 0745   Extremity Assessment Distal to IV No abnormal discoloration 03/09/24 0745   Line Status Infusing 03/09/24 0745   Dressing Status Clean;Dry;Intact 03/09/24 0745   Dressing Intervention Integrity maintained 03/09/24 0745   Number of days: 1       Prev airway: None documented.    Drips: None documented.   lactated ringers 125 mL/hr at 03/09/24 0728       Patient Active Problem List   Diagnosis    History of COVID-19    Convergence insufficiency    TBI (traumatic brain injury)    Migraine without status migrainosus, not intractable    Appendicitis       Review of patient's allergies indicates:  No Known Allergies    Current Inpatient Medications:   acetaminophen  650 mg Oral Q6H    piperacillin-tazobactam (Zosyn) IV (PEDS and ADULTS) (extended infusion is not appropriate)  4.5 g Intravenous Q8H       Antibiotics (From admission, onward)      Start     Stop Route Frequency Ordered    03/09/24 0600  piperacillin-tazobactam (ZOSYN) 4.5 g in dextrose 5 % in water (D5W) 100 mL IVPB (MB+)         -- IV Every 8 hours (non-standard times) 03/09/24 0455            VTE Risk Mitigation (From admission, onward)           Ordered     IP VTE LOW RISK PATIENT  Once         03/09/24  0455     Place sequential compression device  Until discontinued         03/09/24 0455                    No current facility-administered medications on file prior to encounter.     Current Outpatient Medications on File Prior to Encounter   Medication Sig Dispense Refill    amitriptyline (ELAVIL) 10 MG tablet Amytriptyline/baclofen/gabpenten/lidocaine please convert to Vulvodynia cream Sig pea sized amount vaginally up to three times a day as needed (Patient not taking: Reported on 2/8/2024) 30 tablet 2    EPIDUO FORTE 0.3-2.5 % GlwP Apply 1 application topically every evening.      galcanezumab-gnlm (EMGALITY PEN) 120 mg/mL PnIj Inject 120 mg into the skin every 28 days. 1 mL 11    galcanezumab-gnlm (EMGALITY PEN) 120 mg/mL PnIj Inject 1 mL (120 mg total) into the skin every 28 days. 1 mL 11    norethindrone-e.estradioL-iron (MIBELAS 24 FE) 1 mg-20 mcg(24) /75 mg (4) Chew Take 1 tablet by mouth once daily. (Patient not taking: Reported on 12/7/2023) 84 tablet 3    vortioxetine (TRINTELLIX) 10 mg Tab Take 1 tablet (10 mg total) by mouth once daily. (Patient not taking: Reported on 12/7/2023) 30 tablet 12       Past Surgical History:   Procedure Laterality Date    HIP SURGERY         Social History     Socioeconomic History    Marital status: Single   Tobacco Use    Smoking status: Never    Smokeless tobacco: Never   Substance and Sexual Activity    Alcohol use: Yes     Comment: once a week    Drug use: Never    Sexual activity: Not Currently     Partners: Female, Male     Birth control/protection: None       OBJECTIVE:     Vital Signs Range (Last 24H):  Temp:  [36.5 °C (97.7 °F)-36.9 °C (98.5 °F)]   Pulse:  [66-90]   Resp:  [15-18]   BP: (100-110)/(58-72)   SpO2:  [99 %-100 %]       Significant Labs:  Lab Results   Component Value Date    WBC 7.83 03/08/2024    HGB 13.0 03/08/2024    HCT 37 03/08/2024     03/08/2024    ALT 14 03/08/2024    AST 18 03/08/2024     03/08/2024    K 3.8 03/08/2024      03/08/2024    CREATININE 0.7 03/08/2024    BUN 9 03/08/2024    CO2 21 (L) 03/08/2024    TSH 1.546 11/08/2022       Diagnostic Studies: No relevant studies.    EKG:   No results found for this or any previous visit.      ASSESSMENT/PLAN:     Pre-op Assessment    I have reviewed the Patient Summary Reports.     I have reviewed the Nursing Notes. I have reviewed the NPO Status.   I have reviewed the Medications.     Review of Systems  Social:  Non-Smoker       Cardiovascular:      Denies Hypertension.    Denies CAD.        Denies CHF.                                 Pulmonary:    Denies COPD.      Denies Sleep Apnea.                Renal/:   Denies Chronic Renal Disease.                Hepatic/GI:      Denies GERD. Denies Liver Disease.            Musculoskeletal:         Denies Spine Disorders             Neurological:  Denies TIA.  Denies CVA.    Denies Seizures.                                Endocrine:  Denies Diabetes. Denies Hypothyroidism.  Denies Hyperthyroidism.         Psych:  Psychiatric History anxiety depression                Physical Exam  General: Alert and Oriented    Airway:  Mallampati: II   Mouth Opening: Normal  TM Distance: Normal  Tongue: Normal  Neck ROM: Normal ROM        Anesthesia Plan  Type of Anesthesia, risks & benefits discussed:    Anesthesia Type: Gen ETT  Intra-op Monitoring Plan: Standard ASA Monitors  Post Op Pain Control Plan: multimodal analgesia and IV/PO Opioids PRN  Induction:  IV  Airway Plan: Direct and Video, Post-Induction  Informed Consent: Informed consent signed with the Patient and all parties understand the risks and agree with anesthesia plan.  All questions answered.   ASA Score: 2  Day of Surgery Review of History & Physical: H&P Update referred to the surgeon/provider.    Ready For Surgery From Anesthesia Perspective.     .

## 2024-03-09 NOTE — H&P
GENERAL SURGERY  HISTORY AND PHYSICAL       See consult note from 3/9 for full history and physical.        -- Alexus Jones M.D  General Surgery PGY5  Pager: [phone]

## 2024-03-09 NOTE — SUBJECTIVE & OBJECTIVE
Interval History: NAEON. Pt overall feeling okay. NPO for surgery today. Denies N/V. Reports pain is well controlled. OOB as tolerated.       Medications:  Continuous Infusions:   lactated ringers 125 mL/hr at 03/09/24 0728     Scheduled Meds:   acetaminophen  650 mg Oral Q6H    piperacillin-tazobactam (Zosyn) IV (PEDS and ADULTS) (extended infusion is not appropriate)  4.5 g Intravenous Q8H     PRN Meds:LIDOcaine (PF) 10 mg/ml (1%), morphine, morphine, ondansetron, sodium chloride 0.9%     Review of patient's allergies indicates:  No Known Allergies  Objective:     Vital Signs (Most Recent):  Temp: 97.7 °F (36.5 °C) (03/09/24 0721)  Pulse: 66 (03/09/24 0721)  Resp: 15 (03/08/24 2036)  BP: 104/63 (03/09/24 0721)  SpO2: 100 % (03/09/24 0721) Vital Signs (24h Range):  Temp:  [97.7 °F (36.5 °C)-98.5 °F (36.9 °C)] 97.7 °F (36.5 °C)  Pulse:  [66-90] 66  Resp:  [15-18] 15  SpO2:  [99 %-100 %] 100 %  BP: (100-110)/(58-72) 104/63     Weight: 65.3 kg (143 lb 15.4 oz)  Body mass index is 27.2 kg/m².    Intake/Output - Last 3 Shifts         03/07 0700  03/08 0659 03/08 0700  03/09 0659 03/09 0700  03/10 0659    IV Piggyback  1998     Total Intake(mL/kg)  1998 (31.5)     Urine (mL/kg/hr)   600 (4.1)    Stool   0    Total Output   600    Net  +1998 -600           Stool Occurrence   0 x             Physical Exam  Constitutional:       Appearance: Normal appearance.   HENT:      Head: Normocephalic and atraumatic.      Nose: Nose normal.      Mouth/Throat:      Mouth: Mucous membranes are moist.      Pharynx: Oropharynx is clear.   Eyes:      Extraocular Movements: Extraocular movements intact.      Conjunctiva/sclera: Conjunctivae normal.   Cardiovascular:      Rate and Rhythm: Normal rate and regular rhythm.   Pulmonary:      Effort: Pulmonary effort is normal.      Breath sounds: Normal breath sounds.   Abdominal:      General: Abdomen is flat.      Palpations: Abdomen is soft.      Comments: Moderate tenderness in the RLQ    Skin:     General: Skin is warm and dry.      Capillary Refill: Capillary refill takes less than 2 seconds.   Neurological:      General: No focal deficit present.      Mental Status: She is alert.          Significant Labs:  I have reviewed all pertinent lab results within the past 24 hours.    Significant Diagnostics:  I have reviewed all pertinent imaging results/findings within the past 24 hours.

## 2024-03-09 NOTE — PROGRESS NOTES
[facility]  General Surgery  Progress Note    Subjective:     History of Present Illness:  No notes on file    Post-Op Info:  Procedure(s) (LRB):  APPENDECTOMY, LAPAROSCOPIC (N/A)         Interval History: NAEON. Pt overall feeling okay. NPO for surgery today. Denies N/V. Reports pain is well controlled. OOB as tolerated.       Medications:  Continuous Infusions:   lactated ringers 125 mL/hr at 03/09/24 0728     Scheduled Meds:   acetaminophen  650 mg Oral Q6H    piperacillin-tazobactam (Zosyn) IV (PEDS and ADULTS) (extended infusion is not appropriate)  4.5 g Intravenous Q8H     PRN Meds:LIDOcaine (PF) 10 mg/ml (1%), morphine, morphine, ondansetron, sodium chloride 0.9%     Review of patient's allergies indicates:  No Known Allergies  Objective:     Vital Signs (Most Recent):  Temp: 97.7 °F (36.5 °C) (03/09/24 0721)  Pulse: 66 (03/09/24 0721)  Resp: 15 (03/08/24 2036)  BP: 104/63 (03/09/24 0721)  SpO2: 100 % (03/09/24 0721) Vital Signs (24h Range):  Temp:  [97.7 °F (36.5 °C)-98.5 °F (36.9 °C)] 97.7 °F (36.5 °C)  Pulse:  [66-90] 66  Resp:  [15-18] 15  SpO2:  [99 %-100 %] 100 %  BP: (100-110)/(58-72) 104/63     Weight: 65.3 kg (143 lb 15.4 oz)  Body mass index is 27.2 kg/m².    Intake/Output - Last 3 Shifts         03/07 0700  03/08 0659 03/08 0700  03/09 0659 03/09 0700  03/10 0659    IV Piggyback  1998     Total Intake(mL/kg)  1998 (31.5)     Urine (mL/kg/hr)   600 (4.1)    Stool   0    Total Output   600    Net  +1998 -600           Stool Occurrence   0 x             Physical Exam  Constitutional:       Appearance: Normal appearance.   HENT:      Head: Normocephalic and atraumatic.      Nose: Nose normal.      Mouth/Throat:      Mouth: Mucous membranes are moist.      Pharynx: Oropharynx is clear.   Eyes:      Extraocular Movements: Extraocular movements intact.      Conjunctiva/sclera: Conjunctivae normal.   Cardiovascular:      Rate and Rhythm: Normal rate and regular rhythm.   Pulmonary:      Effort:  Pulmonary effort is normal.      Breath sounds: Normal breath sounds.   Abdominal:      General: Abdomen is flat.      Palpations: Abdomen is soft.      Comments: Moderate tenderness in the RLQ   Skin:     General: Skin is warm and dry.      Capillary Refill: Capillary refill takes less than 2 seconds.   Neurological:      General: No focal deficit present.      Mental Status: She is alert.          Significant Labs:  I have reviewed all pertinent lab results within the past 24 hours.    Significant Diagnostics:  I have reviewed all pertinent imaging results/findings within the past 24 hours.  Assessment/Plan:     * Appendicitis  23 F with RLQ pain CT scan showing appendicolith, retrocecal appendix. Will require appendectomy.    - NPO for surgery today  - consent obtained, in chart  - mIVF  - IV abx  - mmpc  - anti-emetics as needed    Dispo: possible discharge today 3/9/24 after surgery        Barney Bran MD  General Surgery  [facility]

## 2024-03-09 NOTE — FIRST PROVIDER EVALUATION
Medical screening examination initiated.  I have conducted a focused provider triage encounter, findings are as follows:    Brief history of present illness:  23 year old female noted vomiting and abdominal pain during virtual OBGYN today.  Referred to urgent Care further workup for referred the patient to the emergency department for rule out appendicitis.    Vitals:    03/08/24 1840   BP: (!) 110/58   Pulse: 87   Resp: 18   Temp: 98.5 °F (36.9 °C)   TempSrc: Oral   SpO2: 99%   Weight: 63.5 kg (140 lb)       Pertinent physical exam:  nontoxic, well appearing    Brief workup plan:  cbc, cmp, ua, upt. Defer imagine decision until full evaluation    Preliminary workup initiated; this workup will be continued and followed by the physician or advanced practice provider that is assigned to the patient when roomed.

## 2024-03-09 NOTE — ED NOTES
Rimma Menezes, a 23 y.o. female presents to the ED w/ complaint of     Triage note:  Chief Complaint   Patient presents with    Vomiting     Nausea, dizziness, vomiting x 1 week., RLQ abd pain. Sent by  for rule out appendicitis     Review of patient's allergies indicates:  No Known Allergies  Past Medical History:   Diagnosis Date    Anxiety     Borderline diabetes     Depression     Migraine     Polycystic ovary    Patient identifiers for Rimma Menezes checked and correct.    LOC: The patient is awake, alert and aware of environment with an appropriate affect, the patient is oriented x 4 and speaking appropriately.  APPEARANCE: Patient resting comfortably and in no acute distress, patient is clean and well groomed, patient's clothing is properly fastened.  SKIN: The skin is warm and dry, color consistent with ethnicity, patient has normal skin turgor and moist mucus membranes, skin intact, no breakdown or bruising noted.  MUSCULOSKELETAL: Patient moving all extremities well, no obvious swelling or deformities noted.  RESPIRATORY: Airway is open and patent, respirations are spontaneous and even, patient has a normal effort and rate.  CARDIAC: Patient has a normal rate and rhythm, no periphreal edema noted, capillary refill < 3 seconds. Normal +2 pedal pulses present.  ABDOMEN: Soft and non tender to palpation, no distention noted. Patient denies any diarrhea, or constipation. +n/v, abd pain  NEUROLOGIC: Eyes open spontaneously, PERRL, behavior appropriate to situation, follows commands, facial expression symmetrical, bilateral hand grasp equal and even, purposeful motor response noted, normal sensation in all extremities.

## 2024-03-09 NOTE — ASSESSMENT & PLAN NOTE
23 F with RLQ pain CT scan showing appendicolith, retrocecal appendix. Will require appendectomy.    - NPO for surgery today  - consent obtained, in chart  - mIVF  - IV abx  - mmpc  - anti-emetics as needed    Dispo: possible discharge today 3/9/24 after surgery

## 2024-03-10 VITALS
HEIGHT: 61 IN | HEART RATE: 70 BPM | OXYGEN SATURATION: 99 % | BODY MASS INDEX: 27.18 KG/M2 | WEIGHT: 143.94 LBS | TEMPERATURE: 98 F | RESPIRATION RATE: 16 BRPM | SYSTOLIC BLOOD PRESSURE: 102 MMHG | DIASTOLIC BLOOD PRESSURE: 60 MMHG

## 2024-03-10 LAB
ALBUMIN SERPL BCP-MCNC: 3.4 G/DL (ref 3.5–5.2)
ALP SERPL-CCNC: 52 U/L (ref 55–135)
ALT SERPL W/O P-5'-P-CCNC: 12 U/L (ref 10–44)
ANION GAP SERPL CALC-SCNC: 10 MMOL/L (ref 8–16)
AST SERPL-CCNC: 16 U/L (ref 10–40)
BASOPHILS # BLD AUTO: 0.04 K/UL (ref 0–0.2)
BASOPHILS NFR BLD: 0.6 % (ref 0–1.9)
BILIRUB SERPL-MCNC: 1.6 MG/DL (ref 0.1–1)
BUN SERPL-MCNC: 6 MG/DL (ref 6–20)
CALCIUM SERPL-MCNC: 8.4 MG/DL (ref 8.7–10.5)
CHLORIDE SERPL-SCNC: 108 MMOL/L (ref 95–110)
CO2 SERPL-SCNC: 19 MMOL/L (ref 23–29)
CREAT SERPL-MCNC: 0.6 MG/DL (ref 0.5–1.4)
DIFFERENTIAL METHOD BLD: ABNORMAL
EOSINOPHIL # BLD AUTO: 0.1 K/UL (ref 0–0.5)
EOSINOPHIL NFR BLD: 1.6 % (ref 0–8)
ERYTHROCYTE [DISTWIDTH] IN BLOOD BY AUTOMATED COUNT: 11.8 % (ref 11.5–14.5)
EST. GFR  (NO RACE VARIABLE): >60 ML/MIN/1.73 M^2
GLUCOSE SERPL-MCNC: 59 MG/DL (ref 70–110)
HCT VFR BLD AUTO: 34.7 % (ref 37–48.5)
HGB BLD-MCNC: 11.6 G/DL (ref 12–16)
IMM GRANULOCYTES # BLD AUTO: 0.01 K/UL (ref 0–0.04)
IMM GRANULOCYTES NFR BLD AUTO: 0.1 % (ref 0–0.5)
LYMPHOCYTES # BLD AUTO: 3.1 K/UL (ref 1–4.8)
LYMPHOCYTES NFR BLD: 46.2 % (ref 18–48)
MAGNESIUM SERPL-MCNC: 1.9 MG/DL (ref 1.6–2.6)
MCH RBC QN AUTO: 30.4 PG (ref 27–31)
MCHC RBC AUTO-ENTMCNC: 33.4 G/DL (ref 32–36)
MCV RBC AUTO: 91 FL (ref 82–98)
MONOCYTES # BLD AUTO: 0.5 K/UL (ref 0.3–1)
MONOCYTES NFR BLD: 8 % (ref 4–15)
NEUTROPHILS # BLD AUTO: 2.9 K/UL (ref 1.8–7.7)
NEUTROPHILS NFR BLD: 43.5 % (ref 38–73)
NRBC BLD-RTO: 0 /100 WBC
PHOSPHATE SERPL-MCNC: 4.1 MG/DL (ref 2.7–4.5)
PLATELET # BLD AUTO: 240 K/UL (ref 150–450)
PMV BLD AUTO: 10.3 FL (ref 9.2–12.9)
POTASSIUM SERPL-SCNC: 3.6 MMOL/L (ref 3.5–5.1)
PROT SERPL-MCNC: 6 G/DL (ref 6–8.4)
RBC # BLD AUTO: 3.81 M/UL (ref 4–5.4)
SODIUM SERPL-SCNC: 137 MMOL/L (ref 136–145)
WBC # BLD AUTO: 6.73 K/UL (ref 3.9–12.7)

## 2024-03-10 PROCEDURE — 37000008 HC ANESTHESIA 1ST 15 MINUTES: Performed by: STUDENT IN AN ORGANIZED HEALTH CARE EDUCATION/TRAINING PROGRAM

## 2024-03-10 PROCEDURE — 88304 TISSUE EXAM BY PATHOLOGIST: CPT | Mod: 26,,, | Performed by: STUDENT IN AN ORGANIZED HEALTH CARE EDUCATION/TRAINING PROGRAM

## 2024-03-10 PROCEDURE — 83735 ASSAY OF MAGNESIUM: CPT | Performed by: STUDENT IN AN ORGANIZED HEALTH CARE EDUCATION/TRAINING PROGRAM

## 2024-03-10 PROCEDURE — 36000708 HC OR TIME LEV III 1ST 15 MIN: Performed by: STUDENT IN AN ORGANIZED HEALTH CARE EDUCATION/TRAINING PROGRAM

## 2024-03-10 PROCEDURE — 27201423 OPTIME MED/SURG SUP & DEVICES STERILE SUPPLY: Performed by: STUDENT IN AN ORGANIZED HEALTH CARE EDUCATION/TRAINING PROGRAM

## 2024-03-10 PROCEDURE — 63600175 PHARM REV CODE 636 W HCPCS: Performed by: NURSE ANESTHETIST, CERTIFIED REGISTERED

## 2024-03-10 PROCEDURE — 25000003 PHARM REV CODE 250: Performed by: STUDENT IN AN ORGANIZED HEALTH CARE EDUCATION/TRAINING PROGRAM

## 2024-03-10 PROCEDURE — 96361 HYDRATE IV INFUSION ADD-ON: CPT

## 2024-03-10 PROCEDURE — D9220A PRA ANESTHESIA: Mod: CRNA,,, | Performed by: NURSE ANESTHETIST, CERTIFIED REGISTERED

## 2024-03-10 PROCEDURE — 44970 LAPAROSCOPY APPENDECTOMY: CPT | Mod: ,,, | Performed by: STUDENT IN AN ORGANIZED HEALTH CARE EDUCATION/TRAINING PROGRAM

## 2024-03-10 PROCEDURE — 96375 TX/PRO/DX INJ NEW DRUG ADDON: CPT

## 2024-03-10 PROCEDURE — 88304 TISSUE EXAM BY PATHOLOGIST: CPT | Performed by: STUDENT IN AN ORGANIZED HEALTH CARE EDUCATION/TRAINING PROGRAM

## 2024-03-10 PROCEDURE — 94761 N-INVAS EAR/PLS OXIMETRY MLT: CPT

## 2024-03-10 PROCEDURE — 71000033 HC RECOVERY, INTIAL HOUR: Performed by: STUDENT IN AN ORGANIZED HEALTH CARE EDUCATION/TRAINING PROGRAM

## 2024-03-10 PROCEDURE — 36415 COLL VENOUS BLD VENIPUNCTURE: CPT | Performed by: STUDENT IN AN ORGANIZED HEALTH CARE EDUCATION/TRAINING PROGRAM

## 2024-03-10 PROCEDURE — 63600175 PHARM REV CODE 636 W HCPCS: Mod: JZ,JG | Performed by: STUDENT IN AN ORGANIZED HEALTH CARE EDUCATION/TRAINING PROGRAM

## 2024-03-10 PROCEDURE — 25000003 PHARM REV CODE 250: Performed by: ANESTHESIOLOGY

## 2024-03-10 PROCEDURE — 84100 ASSAY OF PHOSPHORUS: CPT | Performed by: STUDENT IN AN ORGANIZED HEALTH CARE EDUCATION/TRAINING PROGRAM

## 2024-03-10 PROCEDURE — 25000003 PHARM REV CODE 250: Performed by: NURSE ANESTHETIST, CERTIFIED REGISTERED

## 2024-03-10 PROCEDURE — A4216 STERILE WATER/SALINE, 10 ML: HCPCS | Performed by: STUDENT IN AN ORGANIZED HEALTH CARE EDUCATION/TRAINING PROGRAM

## 2024-03-10 PROCEDURE — 63600175 PHARM REV CODE 636 W HCPCS: Performed by: STUDENT IN AN ORGANIZED HEALTH CARE EDUCATION/TRAINING PROGRAM

## 2024-03-10 PROCEDURE — 63600175 PHARM REV CODE 636 W HCPCS

## 2024-03-10 PROCEDURE — 96366 THER/PROPH/DIAG IV INF ADDON: CPT | Mod: 59

## 2024-03-10 PROCEDURE — 36000709 HC OR TIME LEV III EA ADD 15 MIN: Performed by: STUDENT IN AN ORGANIZED HEALTH CARE EDUCATION/TRAINING PROGRAM

## 2024-03-10 PROCEDURE — 85025 COMPLETE CBC W/AUTO DIFF WBC: CPT | Performed by: STUDENT IN AN ORGANIZED HEALTH CARE EDUCATION/TRAINING PROGRAM

## 2024-03-10 PROCEDURE — D9220A PRA ANESTHESIA: Mod: ANES,,, | Performed by: ANESTHESIOLOGY

## 2024-03-10 PROCEDURE — 37000009 HC ANESTHESIA EA ADD 15 MINS: Performed by: STUDENT IN AN ORGANIZED HEALTH CARE EDUCATION/TRAINING PROGRAM

## 2024-03-10 PROCEDURE — G0378 HOSPITAL OBSERVATION PER HR: HCPCS

## 2024-03-10 PROCEDURE — 63600175 PHARM REV CODE 636 W HCPCS: Performed by: ANESTHESIOLOGY

## 2024-03-10 PROCEDURE — 71000015 HC POSTOP RECOV 1ST HR: Performed by: STUDENT IN AN ORGANIZED HEALTH CARE EDUCATION/TRAINING PROGRAM

## 2024-03-10 PROCEDURE — 80053 COMPREHEN METABOLIC PANEL: CPT | Performed by: STUDENT IN AN ORGANIZED HEALTH CARE EDUCATION/TRAINING PROGRAM

## 2024-03-10 RX ORDER — ONDANSETRON HYDROCHLORIDE 2 MG/ML
INJECTION, SOLUTION INTRAVENOUS
Status: DISCONTINUED | OUTPATIENT
Start: 2024-03-10 | End: 2024-03-10

## 2024-03-10 RX ORDER — DEXAMETHASONE SODIUM PHOSPHATE 4 MG/ML
INJECTION, SOLUTION INTRA-ARTICULAR; INTRALESIONAL; INTRAMUSCULAR; INTRAVENOUS; SOFT TISSUE
Status: DISCONTINUED | OUTPATIENT
Start: 2024-03-10 | End: 2024-03-10

## 2024-03-10 RX ORDER — SUCCINYLCHOLINE CHLORIDE 20 MG/ML
INJECTION INTRAMUSCULAR; INTRAVENOUS
Status: DISCONTINUED | OUTPATIENT
Start: 2024-03-10 | End: 2024-03-10

## 2024-03-10 RX ORDER — HYDROMORPHONE HYDROCHLORIDE 1 MG/ML
0.2 INJECTION, SOLUTION INTRAMUSCULAR; INTRAVENOUS; SUBCUTANEOUS EVERY 5 MIN PRN
Status: DISCONTINUED | OUTPATIENT
Start: 2024-03-10 | End: 2024-03-10 | Stop reason: HOSPADM

## 2024-03-10 RX ORDER — DROPERIDOL 2.5 MG/ML
0.62 INJECTION, SOLUTION INTRAMUSCULAR; INTRAVENOUS ONCE AS NEEDED
Status: DISCONTINUED | OUTPATIENT
Start: 2024-03-10 | End: 2024-03-10 | Stop reason: HOSPADM

## 2024-03-10 RX ORDER — LIDOCAINE HYDROCHLORIDE 20 MG/ML
INJECTION INTRAVENOUS
Status: DISCONTINUED | OUTPATIENT
Start: 2024-03-10 | End: 2024-03-10

## 2024-03-10 RX ORDER — SCOLOPAMINE TRANSDERMAL SYSTEM 1 MG/1
PATCH, EXTENDED RELEASE TRANSDERMAL
Status: DISCONTINUED
Start: 2024-03-10 | End: 2024-03-10 | Stop reason: HOSPADM

## 2024-03-10 RX ORDER — KETAMINE HCL IN 0.9 % NACL 50 MG/5 ML
SYRINGE (ML) INTRAVENOUS
Status: DISCONTINUED | OUTPATIENT
Start: 2024-03-10 | End: 2024-03-10

## 2024-03-10 RX ORDER — FENTANYL CITRATE 50 UG/ML
INJECTION, SOLUTION INTRAMUSCULAR; INTRAVENOUS
Status: DISCONTINUED | OUTPATIENT
Start: 2024-03-10 | End: 2024-03-10

## 2024-03-10 RX ORDER — ACETAMINOPHEN 10 MG/ML
INJECTION, SOLUTION INTRAVENOUS
Status: DISCONTINUED | OUTPATIENT
Start: 2024-03-10 | End: 2024-03-10

## 2024-03-10 RX ORDER — OXYCODONE HYDROCHLORIDE 5 MG/1
5 TABLET ORAL EVERY 4 HOURS PRN
Qty: 5 TABLET | Refills: 0 | Status: SHIPPED | OUTPATIENT
Start: 2024-03-10 | End: 2024-03-10

## 2024-03-10 RX ORDER — SCOLOPAMINE TRANSDERMAL SYSTEM 1 MG/1
PATCH, EXTENDED RELEASE TRANSDERMAL
Status: DISCONTINUED | OUTPATIENT
Start: 2024-03-10 | End: 2024-03-10

## 2024-03-10 RX ORDER — BUPIVACAINE HYDROCHLORIDE 2.5 MG/ML
INJECTION, SOLUTION EPIDURAL; INFILTRATION; INTRACAUDAL
Status: DISCONTINUED | OUTPATIENT
Start: 2024-03-10 | End: 2024-03-10 | Stop reason: HOSPADM

## 2024-03-10 RX ORDER — OXYCODONE HYDROCHLORIDE 5 MG/1
5 TABLET ORAL EVERY 4 HOURS PRN
Qty: 5 TABLET | Refills: 0 | Status: SHIPPED | OUTPATIENT
Start: 2024-03-10 | End: 2024-03-17

## 2024-03-10 RX ORDER — SODIUM CHLORIDE 0.9 % (FLUSH) 0.9 %
10 SYRINGE (ML) INJECTION
Status: DISCONTINUED | OUTPATIENT
Start: 2024-03-10 | End: 2024-03-10 | Stop reason: HOSPADM

## 2024-03-10 RX ORDER — MIDAZOLAM HYDROCHLORIDE 1 MG/ML
INJECTION, SOLUTION INTRAMUSCULAR; INTRAVENOUS
Status: DISCONTINUED | OUTPATIENT
Start: 2024-03-10 | End: 2024-03-10

## 2024-03-10 RX ORDER — ROCURONIUM BROMIDE 10 MG/ML
INJECTION, SOLUTION INTRAVENOUS
Status: DISCONTINUED | OUTPATIENT
Start: 2024-03-10 | End: 2024-03-10

## 2024-03-10 RX ORDER — HALOPERIDOL 5 MG/ML
0.5 INJECTION INTRAMUSCULAR EVERY 10 MIN PRN
Status: DISCONTINUED | OUTPATIENT
Start: 2024-03-10 | End: 2024-03-10 | Stop reason: HOSPADM

## 2024-03-10 RX ORDER — PROPOFOL 10 MG/ML
VIAL (ML) INTRAVENOUS
Status: DISCONTINUED | OUTPATIENT
Start: 2024-03-10 | End: 2024-03-10

## 2024-03-10 RX ORDER — ONDANSETRON HYDROCHLORIDE 2 MG/ML
4 INJECTION, SOLUTION INTRAVENOUS ONCE AS NEEDED
Status: DISCONTINUED | OUTPATIENT
Start: 2024-03-10 | End: 2024-03-10 | Stop reason: HOSPADM

## 2024-03-10 RX ADMIN — MORPHINE SULFATE 4 MG: 4 INJECTION INTRAVENOUS at 12:03

## 2024-03-10 RX ADMIN — PROPOFOL 150 MG: 10 INJECTION, EMULSION INTRAVENOUS at 09:03

## 2024-03-10 RX ADMIN — Medication 10 ML: at 12:03

## 2024-03-10 RX ADMIN — HYDROMORPHONE HYDROCHLORIDE 0.2 MG: 1 INJECTION, SOLUTION INTRAMUSCULAR; INTRAVENOUS; SUBCUTANEOUS at 11:03

## 2024-03-10 RX ADMIN — ACETAMINOPHEN 1000 MG: 10 INJECTION, SOLUTION INTRAVENOUS at 10:03

## 2024-03-10 RX ADMIN — DEXAMETHASONE SODIUM PHOSPHATE 8 MG: 4 INJECTION, SOLUTION INTRAMUSCULAR; INTRAVENOUS at 10:03

## 2024-03-10 RX ADMIN — SUGAMMADEX 200 MG: 100 INJECTION, SOLUTION INTRAVENOUS at 10:03

## 2024-03-10 RX ADMIN — ONDANSETRON 8 MG: 8 TABLET, ORALLY DISINTEGRATING ORAL at 02:03

## 2024-03-10 RX ADMIN — PIPERACILLIN SODIUM AND TAZOBACTAM SODIUM 4.5 G: 4; .5 INJECTION, POWDER, FOR SOLUTION INTRAVENOUS at 06:03

## 2024-03-10 RX ADMIN — ACETAMINOPHEN 650 MG: 325 TABLET ORAL at 12:03

## 2024-03-10 RX ADMIN — FENTANYL CITRATE 25 MCG: 50 INJECTION, SOLUTION INTRAMUSCULAR; INTRAVENOUS at 10:03

## 2024-03-10 RX ADMIN — FENTANYL CITRATE 50 MCG: 50 INJECTION, SOLUTION INTRAMUSCULAR; INTRAVENOUS at 09:03

## 2024-03-10 RX ADMIN — Medication 20 MG: at 09:03

## 2024-03-10 RX ADMIN — SODIUM CHLORIDE: 0.9 INJECTION, SOLUTION INTRAVENOUS at 09:03

## 2024-03-10 RX ADMIN — MIDAZOLAM HYDROCHLORIDE 2 MG: 1 INJECTION, SOLUTION INTRAMUSCULAR; INTRAVENOUS at 09:03

## 2024-03-10 RX ADMIN — SCOPALAMINE 1 PATCH: 1 PATCH, EXTENDED RELEASE TRANSDERMAL at 09:03

## 2024-03-10 RX ADMIN — LIDOCAINE HYDROCHLORIDE 75 MG: 20 INJECTION INTRAVENOUS at 09:03

## 2024-03-10 RX ADMIN — HALOPERIDOL LACTATE 0.5 MG: 5 INJECTION, SOLUTION INTRAMUSCULAR at 11:03

## 2024-03-10 RX ADMIN — ROCURONIUM BROMIDE 5 MG: 10 INJECTION, SOLUTION INTRAVENOUS at 09:03

## 2024-03-10 RX ADMIN — ROCURONIUM BROMIDE 35 MG: 10 INJECTION, SOLUTION INTRAVENOUS at 10:03

## 2024-03-10 RX ADMIN — SUCCINYLCHOLINE CHLORIDE 100 MG: 20 INJECTION, SOLUTION INTRAMUSCULAR; INTRAVENOUS at 09:03

## 2024-03-10 RX ADMIN — SODIUM CHLORIDE: 9 INJECTION, SOLUTION INTRAVENOUS at 01:03

## 2024-03-10 RX ADMIN — ONDANSETRON 4 MG: 2 INJECTION INTRAMUSCULAR; INTRAVENOUS at 10:03

## 2024-03-10 RX ADMIN — SODIUM CHLORIDE, SODIUM GLUCONATE, SODIUM ACETATE, POTASSIUM CHLORIDE, MAGNESIUM CHLORIDE, SODIUM PHOSPHATE, DIBASIC, AND POTASSIUM PHOSPHATE: .53; .5; .37; .037; .03; .012; .00082 INJECTION, SOLUTION INTRAVENOUS at 10:03

## 2024-03-10 NOTE — PROGRESS NOTES
[facility]  General Surgery  Progress Note    Subjective:     History of Present Illness:  No notes on file    Post-Op Info:  Procedure(s) (LRB):  APPENDECTOMY, LAPAROSCOPIC (N/A)   Day of Surgery     Interval History: NAEON. Pt overall feeling well. Tolerated diet yesterday, NPO this AM for surgery. Denies N/V. Admits flatus. Reports pain is well controlled. OOB as tolerated.     Medications:  Continuous Infusions:   sodium chloride 0.9% 100 mL/hr at 03/10/24 0100     Scheduled Meds:   acetaminophen  650 mg Oral Q6H    piperacillin-tazobactam (Zosyn) IV (PEDS and ADULTS) (extended infusion is not appropriate)  4.5 g Intravenous Q8H     PRN Meds:LIDOcaine (PF) 10 mg/ml (1%), morphine, morphine, ondansetron, prochlorperazine, sodium chloride 0.9%     Review of patient's allergies indicates:  No Known Allergies  Objective:     Vital Signs (Most Recent):  Temp: 97.9 °F (36.6 °C) (03/10/24 0503)  Pulse: 78 (03/10/24 0622)  Resp: 18 (03/10/24 0622)  BP: (!) 97/54 (03/10/24 0622)  SpO2: 99 % (03/10/24 0503) Vital Signs (24h Range):  Temp:  [97.2 °F (36.2 °C)-98.3 °F (36.8 °C)] 97.9 °F (36.6 °C)  Pulse:  [72-96] 78  Resp:  [18] 18  SpO2:  [99 %-100 %] 99 %  BP: ()/(54-65) 97/54     Weight: 65.3 kg (143 lb 15.4 oz)  Body mass index is 27.2 kg/m².    Intake/Output - Last 3 Shifts         03/08 0700  03/09 0659 03/09 0700  03/10 0659 03/10 0700  03/11 0659    P.O.  60     I.V. (mL/kg)  2044 (31.3)     IV Piggyback 1998 101.9     Total Intake(mL/kg) 1998 (31.5) 2205.9 (33.8)     Urine (mL/kg/hr)  2300 (1.5)     Stool  0     Total Output  2300     Net +1998 -94.1            Urine Occurrence  3 x     Stool Occurrence  0 x     Emesis Occurrence  0 x              Physical Exam  Constitutional:       Appearance: Normal appearance.   HENT:      Head: Normocephalic and atraumatic.      Nose: Nose normal.      Mouth/Throat:      Mouth: Mucous membranes are moist.      Pharynx: Oropharynx is clear.   Eyes:      Extraocular  Movements: Extraocular movements intact.      Conjunctiva/sclera: Conjunctivae normal.   Cardiovascular:      Rate and Rhythm: Normal rate and regular rhythm.   Pulmonary:      Effort: Pulmonary effort is normal.      Breath sounds: Normal breath sounds.   Abdominal:      General: Abdomen is flat.      Palpations: Abdomen is soft.      Comments: Moderate tenderness in the RLQ   Skin:     General: Skin is warm and dry.      Capillary Refill: Capillary refill takes less than 2 seconds.   Neurological:      General: No focal deficit present.      Mental Status: She is alert.          Significant Labs:  I have reviewed all pertinent lab results within the past 24 hours.    Significant Diagnostics:  I have reviewed all pertinent imaging results/findings within the past 24 hours.  Assessment/Plan:     * Appendicitis  23 F with RLQ pain CT scan showing appendicolith, retrocecal appendix. Will require appendectomy.    - NPO for surgery today  - consent obtained, in chart  - mIVF  - IV abx  - mmpc  - anti-emetics as needed    Dispo: possible discharge today 3/10/24 after surgery        Barney Bran MD  General Surgery  [facility]

## 2024-03-10 NOTE — PLAN OF CARE
Pt Aox4. VSS. No signs of respiratory distress on RA. All IV access removed. Meds delivered to BS.Pt received and reviewed discharge instructions. Pt remained injury free through shift. Pt was taken downstairs via wheelchair. Pt discharge to home via private vehicle.

## 2024-03-10 NOTE — PLAN OF CARE
[facility] - Surgery (2nd Fl)  Discharge Final Note    Primary Care Provider: Jorge Ugalde MD  Expected Discharge Date: 3/10/2024    Patient medically ready for discharge to home.     Transportation by her Mother.     Is family/patient aware of discharge: yes     Hospital follow up scheduled: yes       Final Discharge Note (most recent)       Final Note - 03/10/24 1127          Final Note    Assessment Type Final Discharge Note     Anticipated Discharge Disposition Home or Self Care     Hospital Resources/Appts/Education Provided Provided patient/caregiver with written discharge plan information                     Important Message from Medicare         Referral Info (most recent)       Referral Info    No documentation.                 Contact Info       Lj Yang MD   Specialty: General Surgery    [address]  [facility] [address]   Phone: [phone]       Next Steps: Follow up          Future Appointments   Date Time Provider Department Center   4/17/2024 10:30 AM Ranjit Mendez III, MD [facility] [place] RN  Case Management  Ext: [phone]  03/10/2024

## 2024-03-10 NOTE — TRANSFER OF CARE
"Anesthesia Transfer of Care Note    Patient: Rimma Menezes    Procedure(s) Performed: Procedure(s) (LRB):  APPENDECTOMY, LAPAROSCOPIC (N/A)    Patient location: PACU    Anesthesia Type: general    Transport from OR: Transported from OR on 100% O2 by closed face mask with adequate spontaneous ventilation    Post pain: adequate analgesia    Post assessment: no apparent anesthetic complications    Post vital signs: stable    Level of consciousness: awake    Nausea/Vomiting: no nausea/vomiting    Complications: none    Transfer of care protocol was followed      Last vitals: Visit Vitals  BP (!) 101/54 (BP Location: Left arm, Patient Position: Lying)   Pulse 93   Temp 36.5 °C (97.7 °F) (Temporal)   Resp 18   Ht 5' 1" (1.549 m)   Wt 65.3 kg (143 lb 15.4 oz)   SpO2 100%   Breastfeeding No   BMI 27.20 kg/m²     "

## 2024-03-10 NOTE — ASSESSMENT & PLAN NOTE
23 F with RLQ pain CT scan showing appendicolith, retrocecal appendix. Will require appendectomy.    - NPO for surgery today  - consent obtained, in chart  - mIVF  - IV abx  - mmpc  - anti-emetics as needed    Dispo: possible discharge today 3/10/24 after surgery

## 2024-03-10 NOTE — OP NOTE
Operative Note     03/10/2024    PRE-OP DIAGNOSIS: Acute appendicitis with localized peritonitis without gangrene, unspecified whether abscess present, unspecified whether perforation present [K35.30]      POST-OP DIAGNOSIS: Post-Op Diagnosis Codes:     * Acute appendicitis with localized peritonitis without gangrene, unspecified whether abscess present, unspecified whether perforation present [K35.30]    Procedure(s):  APPENDECTOMY, LAPAROSCOPIC     SURGEON: Surgeon(s) and Role:     * Lj Yang MD - Primary     * Barney Bran MD - Resident - Assisting    ANESTHESIA: General     OPERATIVE FINDINGS: Appendix with appendecolith seen near base and some mild changes of inflammation.     INDICATION FOR PROCEDURE: This patient presents with a history of nausea and new onset abdominal pain.  Imaging and exam were consistent with acute appendicitis.  On discussion with patient decision was made to pursue laparoscopic appendectomy for management and treatment.    PROCEDURE IN DETAIL:  Rimma Menezes is a 23 y.o. female brought to the operating room for definitive surgery of acute appendicitis.  The patient has elected to undergo laparoscopic appendectomy. The patient was informed of the possible risks and complications of the procedure, including but not limited to anesthetic risks, bleeding, infection, and need for additional surgery.  The patient concurred with the proposed plan, and has given informed consent.  The site of surgery was properly noted/marked in the preoperative holding area.    The patient was then brought to the operating room and placed in the supine position with both upper extremities extended.  local and general anesthesia was administered. Perioperative antibiotics were administered consisting of Ancef and a time out was performed confirming the patient, site, and procedure.   The abdomen was then prepped and draped in the usual sterile fashion.    A 2-cm supraaumbilical skin  incision was made. Subcutaneous tissue was bluntly dissected. The umbilical stalk was grasped with penetrating towel clip and elevated and a 1.5-cm midline infraumbilical fascial incision was made. The abdomen was bluntly entered under direct vision. A 0 Vicryl stay suture was placed around the fascial incision in a horizontal mattress fashion. A Nick trocar was placed and the abdomen was insufflated with carbon dioxide to a maximum pressure of 15 mmHg. A 10-mm laparoscope was placed and the abdomen was examined. There was no evidence of injury from the initial trocar placement. Two 5-mm trocars were placed under direct vision through separate stab incisions, one in the suprapubic area avoiding the dome of the bladder and one in the left lower quadrant avoiding the inferior epigastric artery. We directed our attention to the right lower quadrant. The appendix was identified and noted to have mild inflammatory change without evidence of perforation. The appendix was elevated. A mesenteric defect was made at the base of the appendix using the Maryland dissector. The appendix was divided at the base using the Endo-MOON stapler with a blue (45-3.5) load. The mesoappendix was then divided with one white (45-2.5) loads of the stapler. The appendix was placed into an Endocatch bag and removed from the Nick trocar without difficulty. We returned the laparoscope and Nick trocar to the umbilicus and reexamined the right lower quadrant. The staple line on the mesoappendix was examined and no bleeding was noted. The base of the appendix was examined and appeared viable and well-sealed. All ports were removed under direct vision and no bleeding from any port site was noted. The insufflation of the abdomen was evacuated and the laparoscope and Nick trocar were removed. The fascial incision at the umbilical port site was closed with the preexisting 0 Vicryl stitch. All port sites were infiltrated with Marcaine and closed in  a subcuticular fashion. Sterile dressings were applied. The patient was extubated in the Operating Room and transported to the Recovery Room in stable condition. All sponge, instrument and needle counts were correct at the end of the case.    Dr. Yang was present for the case.    ESTIMATED BLOOD LOSS: Minimal    Implants: * No implants in log *    Specimens: Appendix  Specimen (24h ago, onward)       Start     Ordered    03/10/24 0959  Specimen to Pathology, Surgery General Surgery  Once        Comments: Pre-op Diagnosis: Acute appendicitis with localized peritonitis without gangrene, unspecified whether abscess present, unspecified whether perforation present [K35.30]Procedure(s):APPENDECTOMY, LAPAROSCOPIC Number of specimens: 1Name of specimens: 1. Appendix for perm     References:    Click here for ordering Quick Tip   Question Answer Comment   Procedure Type: General Surgery    Specimen Class: Routine/Screening    Which provider would you like to cc? RENETTA YANG    Release to patient Immediate        03/10/24 0959                     COMPLICATIONS: None apparent    DISPOSITION: PACU - hemodynamically stable.    Fer Lutz MD - PGY V  General Surgery

## 2024-03-10 NOTE — NURSING TRANSFER
Nursing Transfer Note      3/10/2024   11:44 AM    Nurse giving handoff:BHANU Vergara  Nurse receiving handoff:Lanie    Reason patient is being transferred: post procedure    Transfer To: [place] [id number]    Transfer via stretcher    Transported by transport    Order for Tele Monitor? No    Patient belongings transferred with patient: No    Chart send with patient: Yes    Notified: Mother    Patient reassessed at: 1144

## 2024-03-10 NOTE — PLAN OF CARE
Problem: Adult Inpatient Plan of Care  Goal: Plan of Care Review  Outcome: Ongoing, Progressing  Goal: Patient-Specific Goal (Individualized)  Outcome: Ongoing, Progressing  Goal: Absence of Hospital-Acquired Illness or Injury  Outcome: Ongoing, Progressing  Goal: Optimal Comfort and Wellbeing  Outcome: Ongoing, Progressing  Goal: Readiness for Transition of Care  Outcome: Ongoing, Progressing   [facility] Plan of Care Note  Dx Appendictis    Shift Events no events- no nausea, no emesis, rated pain 2/10  Goals of Care: surgery done, discharge, VS and LAB WDL    Neuro: AAOx4    Vital Signs: stable with soft BP    Respiratory: RA    Diet: NPO after midnight    Is patient tolerating current diet? NA    GTTS: NS at 100    Urine Output/Bowel Movement: urinated without difficulty    Drains/Tubes/Tube Feeds (include total output/shift): none    Lines: 1 PIV      Accuchecks:none    Skin: intact    Fall Risk Score: 0    Activity level? Up ad mechelle    Any scheduled procedures? none    Any safety concerns? Fall risk precaution    Other: minimal disturbance throughout the night per pt and her mother request- pt said she has not slept for 2 days  '

## 2024-03-10 NOTE — BRIEF OP NOTE
[facility] - [facility] (2nd Fl)  Brief Operative Note    SUMMARY     Surgery Date: 3/10/2024     Surgeon(s) and Role:     * Lj Yang MD - Primary     * Barney Bran MD - Resident - Assisting    Pre-op Diagnosis:  Acute appendicitis with localized peritonitis without gangrene, unspecified whether abscess present, unspecified whether perforation present [K35.30]    Post-op Diagnosis:  Post-Op Diagnosis Codes:     * Acute appendicitis with localized peritonitis without gangrene, unspecified whether abscess present, unspecified whether perforation present [K35.30]    Procedure(s) (LRB):  APPENDECTOMY, LAPAROSCOPIC (N/A)    Anesthesia: General    Implants:  * No implants in log *    Operative Findings: Appendix with appendecolith seen near base and some mild changes of inflammation.  See operative report for details.    Estimated Blood Loss: Minimal    Estimated Blood Loss has been documented.         Specimens:   Specimen (24h ago, onward)       Start     Ordered    03/10/24 0959  Specimen to Pathology, Surgery General Surgery  Once        Comments: Pre-op Diagnosis: Acute appendicitis with localized peritonitis without gangrene, unspecified whether abscess present, unspecified whether perforation present [K35.30]Procedure(s):APPENDECTOMY, LAPAROSCOPIC Number of specimens: 1Name of specimens: 1. Appendix for perm     References:    Click here for ordering Quick Tip   Question Answer Comment   Procedure Type: General Surgery    Specimen Class: Routine/Screening    Which provider would you like to cc? LJ YANG    Release to patient Immediate        03/10/24 0959                    [id number]

## 2024-03-10 NOTE — HOSPITAL COURSE
Rimma Menezes [place] [facility] [place] the morning of 3/9/2024 for abdominal pain which was diagnosed as acute appendicitis. She underwent a laparoscopic appendectomy on 3/10. The procedure was performed without complication and she was transferred to the PACU for further post op care and monitoring. Her postoperative course was uneventful and she progressed according to plan.  Her pain was controlled with a combination of IV and PO multimodal pain medications. Her diet has been advanced throughout her hospital stay. She is now ambulating without assistance, tolerating a regular diet, pain is well controlled with PO pain medication, and she is voiding appropriately. She now meets all criteria for discharge.

## 2024-03-10 NOTE — PLAN OF CARE
Chart reviewed. Preop nursing care completed per orders. Safe surgery checklist completed. Mom at bedside. Call bell within reach. Instructed pt to call for assistance.

## 2024-03-10 NOTE — DISCHARGE INSTRUCTIONS
"Discharge Instructions After Abdominal Operation     Pain Control: It is expected to have pain after surgery, but this should improve over the next several weeks. You may be prescribed a narcotic pain medication on discharge. You can take this medication as prescribed if the pain is severe but try to decrease the frequency at which you use the narcotic over the initial two (2) weeks.  You may find you need less narcotic pain medication if you combine or stagger it with Tylenol® (acetaminophen) and/or Advil® (ibuprofen). For example, you may take Tylenol 1000mg, then take Advil 600mg 3 hours later, then repeat Tylenol 3 hours after the Advil, and so on.  You should not take more than 4000 mg of Tylenol® or 3200 mg of Advil® in a 24-hour period.    Wound Care: You have "skin glue" covering your incisions which will peel/crumble off on its own over the next week or two. If you have staples in place, staples are removed in 2-3 weeks by a nurse or physician.  You should shower every day without a dressing on the incisions and let the water run over the incisions. Do not soak in a bathtub, hot tub or pool until the incisions are completely healed, which usually takes ~4-6 weeks.    Diet: Unless directed otherwise by your surgeon, after surgery you should do the following:  Eat several (4-6) small meals each day.  If you experience difficulty eating, add in supplemental drinks (Boost®, Ensure®, Glucerna®).  If you are having loose stools, your diet should include foods to add bulk to the stool such as applesauce, bananas, cheese, peanut butter, pasta, and potatoes.    Activity: Walking is encouraged after surgery. Light aerobic activity such as climbing stairs or leisurely bike riding is acceptable but listen to your body and let pain be your guide when reintroducing activity. If it hurts, don't do it. Avoid the following activities for six (6) weeks after surgery:  Lifting objects over 10 pounds  Strenuous activity such " as press-ups, push-ups, crunches, sit-ups, vigorous pulling/pushing, and repetitive twisting or bending    Driving: You should not drive a vehicle for the two (2) weeks after surgery or while taking narcotic pain medications. When you return to driving, sit in your vehicle without starting the ignition and step on the brake firmly and rapidly a few times to assure you are confident with this task. Do not go alone the first time you drive.    Potential Problems:   Bowel obstruction or ileus is characterized by persistent abdominal cramps, bloating, constipation, nausea, or vomiting. If the symptoms are mild, you should restrict your dietary intake to only liquids, and avoid solid food for 2-3 days. If the symptoms are more severe or persist beyond 24 hours, please call your surgeon's office for advice.     Wound infection can occur after any surgery and is characterized by increased drainage of cloudy fluid, odor, pain around the wound, redness of the skin around the wound extending 2-3 fingerbreadths outward, or temperature greater than 101 degrees. Please immediately call your surgeon's office if you begin experiencing these symptoms or signs.

## 2024-03-10 NOTE — ANESTHESIA POSTPROCEDURE EVALUATION
Anesthesia Post Evaluation    Patient: Rimma Menezes    Procedure(s) Performed: Procedure(s) (LRB):  APPENDECTOMY, LAPAROSCOPIC (N/A)    Final Anesthesia Type: general      Patient location during evaluation: PACU  Patient participation: Yes- Able to Participate  Level of consciousness: awake and alert  Post-procedure vital signs: reviewed and stable  Pain management: adequate  Airway patency: patent    PONV status at discharge: No PONV  Anesthetic complications: no      Cardiovascular status: blood pressure returned to baseline  Respiratory status: unassisted  Follow-up not needed.              Vitals Value Taken Time   /60 03/10/24 1132   Temp 36.5 °C (97.7 °F) 03/10/24 1045   Pulse 70 03/10/24 1140   Resp 16 03/10/24 1216   SpO2 100 % 03/10/24 1140   Vitals shown include unvalidated device data.      Event Time   Out of Recovery 03/10/2024 11:00:00         Pain/Yaya Score: Pain Rating Prior to Med Admin: 8 (3/10/2024 12:15 PM)  Pain Rating Post Med Admin: 5 (3/10/2024 11:18 AM)  Yaya Score: 10 (3/10/2024 11:00 AM)

## 2024-03-10 NOTE — DISCHARGE SUMMARY
[facility] ([facility])  General Surgery  Discharge Summary      Patient Name: Rimma Menezes  MRN: [id number]  Admission Date: 3/8/2024  Hospital Length of Stay: 0 days  Discharge Date and Time:  03/10/2024   Attending Physician: Lj Yang MD   Discharging Provider: Fer Lutz MD  Primary Care Provider: Jorge Ugalde MD     HPI: Rimma Menezes is a 23 y.o. healthy female presents to the ED with 5-days of nausea and 1-day of periumbilical abdominal pain. She was seen by her PCP, prompting ED evaluation. She notes no oral intake today, due to the nausea and pain. No changes to her bowel movements. No prior occurrence of similar pain. No prior intra-abdominal surgeries, she has had a hip surgery before. No prior MI/stroke/DVT, not on anticoagulation.      In ED, she is hemodynamically stable with a normal WBC. CT A/P with a borderline normal appendix, questionable wall thickening with a 2 mm appendicolith. No jem-appendiceal inflammation.      General Surgery has been consulted for evaluation for appendicitis.    Procedure(s) (LRB):  APPENDECTOMY, LAPAROSCOPIC (N/A)     Hospital Course: Patient went to the OR on 3/10 for a laparoscopic appendectomy secondary to acute appendicitis without gangrene or perforation.  The patient tolerated the procedure well and was transferred to the PACU in stable condition.  Their post op course was uncomplicated.  The patients pain was controlled with PO medications.  Ambulating without issue.  Voiding without issue with adequate urine output.  Tolerating diet without nausea or vomiting.  Incision site is clean, dry, and intact.  Discharged on POD 0 in good condition.    Consults:   Consults (From admission, onward)          Status Ordering Provider     Inpatient consult to General surgery  Once        Provider:  (Not yet assigned)    Completed ALEJANDRA AYERS            Significant Diagnostic Studies: Labs: BMP:   Recent Labs   Lab 03/08/24 2033  03/10/24  0515   GLU 70 59*    137   K 3.8 3.6    108   CO2 21* 19*   BUN 9 6   CREATININE 0.7 0.6   CALCIUM 9.1 8.4*   MG  --  1.9   , CMP   Recent Labs   Lab 03/08/24  2033 03/10/24  0515    137   K 3.8 3.6    108   CO2 21* 19*   GLU 70 59*   BUN 9 6   CREATININE 0.7 0.6   CALCIUM 9.1 8.4*   PROT 7.0 6.0   ALBUMIN 4.0 3.4*   BILITOT 1.2* 1.6*   ALKPHOS 63 52*   AST 18 16   ALT 14 12   ANIONGAP 10 10   , and CBC   Recent Labs   Lab 03/08/24  2033 03/08/24  2040 03/10/24  0515   WBC 7.83  --  6.73   HGB 13.0  --  11.6*   HCT 38.8   < > 34.7*     --  240    < > = values in this interval not displayed.       Pending Diagnostic Studies:       Procedure Component Value Units Date/Time    Specimen to Pathology, Surgery General Surgery [[id number]] Collected: 03/10/24 0959    Order Status: Sent Lab Status: In process Updated: 03/10/24 0959    Specimen: Tissue           Final Active Diagnoses:    Diagnosis Date Noted POA    PRINCIPAL PROBLEM:  Appendicitis [K37] 03/09/2024 Yes      Problems Resolved During this Admission:      Discharged Condition: good    Disposition: Home or Self Care    Follow Up:   Follow-up Information       Lj Yang MD Follow up.    Specialty: General Surgery  Contact information:  [address] [place] [address] [id number] [phone]                           Patient Instructions:      Diet Adult Regular     Lifting restrictions   Order Comments: Don't lift more than 10lbs for at least 6 weeks.     No driving until:   Order Comments: No driving while using narcotics.     No dressing needed     Notify your health care provider if you experience any of the following:  temperature >100.4     Notify your health care provider if you experience any of the following:  persistent nausea and vomiting or diarrhea     Notify your health care provider if you experience any of the following:  severe uncontrolled pain     Notify your health care provider if you  experience any of the following:  redness, tenderness, or signs of infection (pain, swelling, redness, odor or green/yellow discharge around incision site)     Notify your health care provider if you experience any of the following:  difficulty breathing or increased cough     Notify your health care provider if you experience any of the following:  increased confusion or weakness     Activity as tolerated     Medications:  Reconciled Home Medications:      Medication List        START taking these medications      oxyCODONE 5 MG immediate release tablet  Commonly known as: ROXICODONE  Take 1 tablet (5 mg total) by mouth every 4 (four) hours as needed for Pain.            CONTINUE taking these medications      * EMGALITY  mg/mL Pnij  Generic drug: galcanezumab-gnlm  Inject 120 mg into the skin every 28 days.     * EMGALITY  mg/mL Pnij  Generic drug: galcanezumab-gnlm  Inject 1 mL (120 mg total) into the skin every 28 days.     EPIDUO FORTE 0.3-2.5 % Glwp  Generic drug: adapalene-benzoyl peroxide  Apply 1 application topically every evening.           * This list has 2 medication(s) that are the same as other medications prescribed for you. Read the directions carefully, and ask your doctor or other care provider to review them with you.                ASK your doctor about these medications      amitriptyline 10 MG tablet  Commonly known as: ELAVIL  Amytriptyline/baclofen/gabpenten/lidocaine please convert to Vulvodynia cream Sig pea sized amount vaginally up to three times a day as needed     norethindrone-e.estradioL-iron 1 mg-20 mcg(24) /75 mg (4) Chew  Commonly known as: MIBELAS 24 FE  Take 1 tablet by mouth once daily.     TRINTELLIX 10 mg Tab  Generic drug: vortioxetine  Take 1 tablet (10 mg total) by mouth once daily.              Fer Lutz MD  General Surgery  [facility] - Surgery (2nd Fl)

## 2024-03-10 NOTE — SUBJECTIVE & OBJECTIVE
Interval History: NAEON. Pt overall feeling well. Tolerated diet yesterday, NPO this AM for surgery. Denies N/V. Admits flatus. Reports pain is well controlled. OOB as tolerated.     Medications:  Continuous Infusions:   sodium chloride 0.9% 100 mL/hr at 03/10/24 0100     Scheduled Meds:   acetaminophen  650 mg Oral Q6H    piperacillin-tazobactam (Zosyn) IV (PEDS and ADULTS) (extended infusion is not appropriate)  4.5 g Intravenous Q8H     PRN Meds:LIDOcaine (PF) 10 mg/ml (1%), morphine, morphine, ondansetron, prochlorperazine, sodium chloride 0.9%     Review of patient's allergies indicates:  No Known Allergies  Objective:     Vital Signs (Most Recent):  Temp: 97.9 °F (36.6 °C) (03/10/24 0503)  Pulse: 78 (03/10/24 0622)  Resp: 18 (03/10/24 0622)  BP: (!) 97/54 (03/10/24 0622)  SpO2: 99 % (03/10/24 0503) Vital Signs (24h Range):  Temp:  [97.2 °F (36.2 °C)-98.3 °F (36.8 °C)] 97.9 °F (36.6 °C)  Pulse:  [72-96] 78  Resp:  [18] 18  SpO2:  [99 %-100 %] 99 %  BP: ()/(54-65) 97/54     Weight: 65.3 kg (143 lb 15.4 oz)  Body mass index is 27.2 kg/m².    Intake/Output - Last 3 Shifts         03/08 0700  03/09 0659 03/09 0700  03/10 0659 03/10 0700  03/11 0659    P.O.  60     I.V. (mL/kg)  2044 (31.3)     IV Piggyback 1998 101.9     Total Intake(mL/kg) 1998 (31.5) 2205.9 (33.8)     Urine (mL/kg/hr)  2300 (1.5)     Stool  0     Total Output  2300     Net +1998 -94.1            Urine Occurrence  3 x     Stool Occurrence  0 x     Emesis Occurrence  0 x              Physical Exam  Constitutional:       Appearance: Normal appearance.   HENT:      Head: Normocephalic and atraumatic.      Nose: Nose normal.      Mouth/Throat:      Mouth: Mucous membranes are moist.      Pharynx: Oropharynx is clear.   Eyes:      Extraocular Movements: Extraocular movements intact.      Conjunctiva/sclera: Conjunctivae normal.   Cardiovascular:      Rate and Rhythm: Normal rate and regular rhythm.   Pulmonary:      Effort: Pulmonary effort is  normal.      Breath sounds: Normal breath sounds.   Abdominal:      General: Abdomen is flat.      Palpations: Abdomen is soft.      Comments: Moderate tenderness in the RLQ   Skin:     General: Skin is warm and dry.      Capillary Refill: Capillary refill takes less than 2 seconds.   Neurological:      General: No focal deficit present.      Mental Status: She is alert.          Significant Labs:  I have reviewed all pertinent lab results within the past 24 hours.    Significant Diagnostics:  I have reviewed all pertinent imaging results/findings within the past 24 hours.

## 2024-03-10 NOTE — ANESTHESIA PROCEDURE NOTES
Intubation    Date/Time: 3/10/2024 9:56 AM    Performed by: Emilie Emerson CRNA  Authorized by: Ranjit Baptiste Jr., MD    Intubation:     Induction:  Intravenous    Intubated:  Postinduction    Mask Ventilation:  Easy mask    Attempts:  1    Attempted By:  CRNA    Method of Intubation:  Direct and video laryngoscopy    Blade:  Glover 3    Laryngeal View Grade: Grade I - full view of cords      Difficult Airway Encountered?: No      Complications:  None    Airway Device:  Oral endotracheal tube    Airway Device Size:  7.5    Style/Cuff Inflation:  Cuffed (inflated to minimal occlusive pressure)    Tube secured:  21    Secured at:  The lips    Placement Verified By:  Capnometry    Complicating Factors:  None    Findings Post-Intubation:  BS equal bilateral and atraumatic/condition of teeth unchanged

## 2024-03-11 ENCOUNTER — TELEPHONE (OUTPATIENT)
Dept: URGENT CARE | Facility: CLINIC | Age: 24
End: 2024-03-11
Payer: COMMERCIAL

## 2024-03-11 NOTE — TELEPHONE ENCOUNTER
[facility] Affairs notified [facility] that this student had surgery recently and requested [facility] staff to phone patient to follow-up and offer assistance with follow up care.  No answer, unable to leave message due to voicemail full.

## 2024-03-12 ENCOUNTER — TELEPHONE (OUTPATIENT)
Dept: URGENT CARE | Facility: CLINIC | Age: 24
End: 2024-03-12
Payer: COMMERCIAL

## 2024-03-12 ENCOUNTER — PATIENT MESSAGE (OUTPATIENT)
Dept: SURGERY | Facility: CLINIC | Age: 24
End: 2024-03-12
Payer: COMMERCIAL

## 2024-03-12 NOTE — TELEPHONE ENCOUNTER
Phoned patient to follow up, offer support and discuss time off needed from school.  Patient states she is recovering well, but still in pain.  Patient states she is taking pain meds as prescribed and has friend helping her at home.  Patient states she is living off campus with roommates in [facility].  Pt sent doctor's note to Student Affairs stating she can be out of school until 3/25/2024 or return sooner if she is feeling better.  Patient wants to return on 3/18 if possible.  Will ask Student Affairs to send letter to professors requesting excused absence 3/8 - 3/17/2024 for medical reasons and request flexibility with assignments.  Pt agreed.  Instructed pt to notify [facility] if more time is needed.  Also instructed pt to call [facility] if any assistance is needed with follow up care.  Pt verbalized understanding.

## 2024-03-14 LAB
FINAL PATHOLOGIC DIAGNOSIS: NORMAL
GROSS: NORMAL
Lab: NORMAL
MICROSCOPIC EXAM: NORMAL

## 2024-03-19 ENCOUNTER — PATIENT MESSAGE (OUTPATIENT)
Dept: SURGERY | Facility: CLINIC | Age: 24
End: 2024-03-19
Payer: COMMERCIAL

## 2024-03-20 ENCOUNTER — OFFICE VISIT (OUTPATIENT)
Dept: SURGERY | Facility: CLINIC | Age: 24
End: 2024-03-20
Payer: COMMERCIAL

## 2024-03-20 VITALS
SYSTOLIC BLOOD PRESSURE: 106 MMHG | DIASTOLIC BLOOD PRESSURE: 68 MMHG | OXYGEN SATURATION: 99 % | HEIGHT: 61 IN | HEART RATE: 98 BPM | BODY MASS INDEX: 26.16 KG/M2 | WEIGHT: 138.56 LBS

## 2024-03-20 DIAGNOSIS — Z90.49 S/P LAPAROSCOPIC APPENDECTOMY: Primary | ICD-10-CM

## 2024-03-20 PROCEDURE — 1159F MED LIST DOCD IN RCRD: CPT | Mod: CPTII,S$GLB,, | Performed by: STUDENT IN AN ORGANIZED HEALTH CARE EDUCATION/TRAINING PROGRAM

## 2024-03-20 PROCEDURE — 3078F DIAST BP <80 MM HG: CPT | Mod: CPTII,S$GLB,, | Performed by: STUDENT IN AN ORGANIZED HEALTH CARE EDUCATION/TRAINING PROGRAM

## 2024-03-20 PROCEDURE — 3074F SYST BP LT 130 MM HG: CPT | Mod: CPTII,S$GLB,, | Performed by: STUDENT IN AN ORGANIZED HEALTH CARE EDUCATION/TRAINING PROGRAM

## 2024-03-20 PROCEDURE — 99024 POSTOP FOLLOW-UP VISIT: CPT | Mod: S$GLB,,, | Performed by: STUDENT IN AN ORGANIZED HEALTH CARE EDUCATION/TRAINING PROGRAM

## 2024-03-20 PROCEDURE — 99999 PR PBB SHADOW E&M-EST. PATIENT-LVL III: CPT | Mod: PBBFAC,,, | Performed by: STUDENT IN AN ORGANIZED HEALTH CARE EDUCATION/TRAINING PROGRAM

## 2024-03-20 PROCEDURE — 1160F RVW MEDS BY RX/DR IN RCRD: CPT | Mod: CPTII,S$GLB,, | Performed by: STUDENT IN AN ORGANIZED HEALTH CARE EDUCATION/TRAINING PROGRAM

## 2024-03-20 NOTE — PROGRESS NOTES
"[facility]  General Surgery  Post-Operative Note    Subjective:       Rimma Menezes is a 23 F who presents to the clinic 2 weeks following laparoscopic appendectomy. She is eating a regular diet without difficulty. Bowel movements are Normal. The patient admits some tightness in the umbilical region, tenderness if you push deeper.     Objective:      /68 (BP Location: Right arm, Patient Position: Sitting, BP Method: Medium (Automatic))   Pulse 98   Ht 5' 1" (1.549 m)   Wt 62.9 kg (138 lb 9 oz)   SpO2 99%   BMI 26.18 kg/m²     General:  alert, appears stated age, and cooperative   Abdomen: soft, bowel sounds active, non-tender   Incision:   healing well, no drainage, no erythema, no hernia, no seroma, no swelling, well approximated, no dehiscence, incision well approximated. Small firmness felt superior to the incision, tender to deeper palpation      Pathology: Appendix, laparoscopic appendectomy:   - Mild acute appendicitis   - Negative for dysplasia or malignancy     Assessment:     Rimma Menezes is a 23 year-old woman who is s/p lap appendectomy for appendicitis, doing well.    Plan:     1. Continue any current medications.  2. Wound care discussed.  3. Return to full duty in 4 weeks.  4. Follow up PRN.    Barney Bran MD  General Surgery PGY-1    Attestation: I have reviewed and concur with the resident's history, physical, assessment, and plan.  I have personally interviewed and examined the patient at bedside.  See below addendum for my evaluation and additional findings.    Patient is doing well after surgery. No issues with eating or having bowel movements. Some tightness at the umbilical incision site. No drainage. Incisions look fine. No strenuous activities for 1 more month. Follow up as needed.      Lj Yang MD, FACS  General Surgery and Surgical Critical Care  [facility]  "

## 2024-03-22 ENCOUNTER — PATIENT MESSAGE (OUTPATIENT)
Dept: OBSTETRICS AND GYNECOLOGY | Facility: CLINIC | Age: 24
End: 2024-03-22
Payer: COMMERCIAL

## 2024-03-28 ENCOUNTER — PATIENT MESSAGE (OUTPATIENT)
Dept: NEUROLOGY | Facility: CLINIC | Age: 24
End: 2024-03-28
Payer: COMMERCIAL

## 2024-04-01 ENCOUNTER — TELEPHONE (OUTPATIENT)
Dept: NEUROLOGY | Facility: CLINIC | Age: 24
End: 2024-04-01
Payer: COMMERCIAL

## 2024-04-01 NOTE — TELEPHONE ENCOUNTER
Called pt to follow up and schedule appt     Pt mother answered  Took message to call office back     ND

## 2024-04-01 NOTE — TELEPHONE ENCOUNTER
Called pt to reschedule her Botox apt. Pt did not answer so I left a voice mail telling her to call us back to get that apt rescheduled.

## 2024-04-12 ENCOUNTER — HOSPITAL ENCOUNTER (EMERGENCY)
Facility: HOSPITAL | Age: 24
Discharge: HOME | End: 2024-04-12
Attending: STUDENT IN AN ORGANIZED HEALTH CARE EDUCATION/TRAINING PROGRAM
Payer: COMMERCIAL

## 2024-04-12 ENCOUNTER — NURSE TRIAGE (OUTPATIENT)
Dept: ADMINISTRATIVE | Facility: CLINIC | Age: 24
End: 2024-04-12
Payer: COMMERCIAL

## 2024-04-12 VITALS
OXYGEN SATURATION: 99 % | HEIGHT: 61 IN | BODY MASS INDEX: 26.06 KG/M2 | HEART RATE: 109 BPM | SYSTOLIC BLOOD PRESSURE: 105 MMHG | TEMPERATURE: 98 F | RESPIRATION RATE: 19 BRPM | WEIGHT: 138 LBS | DIASTOLIC BLOOD PRESSURE: 62 MMHG

## 2024-04-12 DIAGNOSIS — R07.9 CHEST PAIN: Primary | ICD-10-CM

## 2024-04-12 DIAGNOSIS — N64.9 BREAST LESION: ICD-10-CM

## 2024-04-12 DIAGNOSIS — M79.604 RIGHT LEG PAIN: ICD-10-CM

## 2024-04-12 LAB
ALBUMIN SERPL BCP-MCNC: 4.5 G/DL (ref 3.5–5.2)
ALP SERPL-CCNC: 79 U/L (ref 55–135)
ALT SERPL W/O P-5'-P-CCNC: 31 U/L (ref 10–44)
ANION GAP SERPL CALC-SCNC: 10 MMOL/L (ref 8–16)
AST SERPL-CCNC: 21 U/L (ref 10–40)
B-HCG UR QL: NEGATIVE
BASOPHILS # BLD AUTO: 0.04 K/UL (ref 0–0.2)
BASOPHILS NFR BLD: 0.6 % (ref 0–1.9)
BILIRUB SERPL-MCNC: 1.9 MG/DL (ref 0.1–1)
BNP SERPL-MCNC: <10 PG/ML (ref 0–99)
BUN SERPL-MCNC: 8 MG/DL (ref 6–20)
CALCIUM SERPL-MCNC: 10 MG/DL (ref 8.7–10.5)
CHLORIDE SERPL-SCNC: 102 MMOL/L (ref 95–110)
CO2 SERPL-SCNC: 25 MMOL/L (ref 23–29)
CREAT SERPL-MCNC: 0.7 MG/DL (ref 0.5–1.4)
CTP QC/QA: YES
D DIMER PPP IA.FEU-MCNC: <0.19 MG/L FEU
DIFFERENTIAL METHOD BLD: NORMAL
EOSINOPHIL # BLD AUTO: 0.1 K/UL (ref 0–0.5)
EOSINOPHIL NFR BLD: 0.8 % (ref 0–8)
ERYTHROCYTE [DISTWIDTH] IN BLOOD BY AUTOMATED COUNT: 12.3 % (ref 11.5–14.5)
EST. GFR  (NO RACE VARIABLE): >60 ML/MIN/1.73 M^2
GLUCOSE SERPL-MCNC: 78 MG/DL (ref 70–110)
HCT VFR BLD AUTO: 41.5 % (ref 37–48.5)
HGB BLD-MCNC: 13.9 G/DL (ref 12–16)
IMM GRANULOCYTES # BLD AUTO: 0.01 K/UL (ref 0–0.04)
IMM GRANULOCYTES NFR BLD AUTO: 0.2 % (ref 0–0.5)
INFLUENZA A, MOLECULAR: NEGATIVE
INFLUENZA B, MOLECULAR: NEGATIVE
LYMPHOCYTES # BLD AUTO: 2.1 K/UL (ref 1–4.8)
LYMPHOCYTES NFR BLD: 33.4 % (ref 18–48)
MCH RBC QN AUTO: 30.5 PG (ref 27–31)
MCHC RBC AUTO-ENTMCNC: 33.5 G/DL (ref 32–36)
MCV RBC AUTO: 91 FL (ref 82–98)
MONOCYTES # BLD AUTO: 0.6 K/UL (ref 0.3–1)
MONOCYTES NFR BLD: 8.7 % (ref 4–15)
NEUTROPHILS # BLD AUTO: 3.6 K/UL (ref 1.8–7.7)
NEUTROPHILS NFR BLD: 56.3 % (ref 38–73)
NRBC BLD-RTO: 0 /100 WBC
OHS QRS DURATION: 76 MS
OHS QRS DURATION: 76 MS
OHS QTC CALCULATION: 446 MS
OHS QTC CALCULATION: 460 MS
PLATELET # BLD AUTO: 265 K/UL (ref 150–450)
PMV BLD AUTO: 10 FL (ref 9.2–12.9)
POTASSIUM SERPL-SCNC: 3.8 MMOL/L (ref 3.5–5.1)
PROT SERPL-MCNC: 7.9 G/DL (ref 6–8.4)
RBC # BLD AUTO: 4.56 M/UL (ref 4–5.4)
SARS-COV-2 RDRP RESP QL NAA+PROBE: NEGATIVE
SODIUM SERPL-SCNC: 137 MMOL/L (ref 136–145)
SPECIMEN SOURCE: NORMAL
TROPONIN I SERPL DL<=0.01 NG/ML-MCNC: <0.006 NG/ML (ref 0–0.03)
WBC # BLD AUTO: 6.34 K/UL (ref 3.9–12.7)

## 2024-04-12 PROCEDURE — 96374 THER/PROPH/DIAG INJ IV PUSH: CPT | Mod: 59

## 2024-04-12 PROCEDURE — 96361 HYDRATE IV INFUSION ADD-ON: CPT

## 2024-04-12 PROCEDURE — 96375 TX/PRO/DX INJ NEW DRUG ADDON: CPT

## 2024-04-12 PROCEDURE — 87502 INFLUENZA DNA AMP PROBE: CPT | Performed by: STUDENT IN AN ORGANIZED HEALTH CARE EDUCATION/TRAINING PROGRAM

## 2024-04-12 PROCEDURE — 99284 EMERGENCY DEPT VISIT MOD MDM: CPT | Mod: 25

## 2024-04-12 PROCEDURE — 25500020 PHARM REV CODE 255: Performed by: STUDENT IN AN ORGANIZED HEALTH CARE EDUCATION/TRAINING PROGRAM

## 2024-04-12 PROCEDURE — 83880 ASSAY OF NATRIURETIC PEPTIDE: CPT | Performed by: STUDENT IN AN ORGANIZED HEALTH CARE EDUCATION/TRAINING PROGRAM

## 2024-04-12 PROCEDURE — 87502 INFLUENZA DNA AMP PROBE: CPT

## 2024-04-12 PROCEDURE — U0002 COVID-19 LAB TEST NON-CDC: HCPCS | Performed by: STUDENT IN AN ORGANIZED HEALTH CARE EDUCATION/TRAINING PROGRAM

## 2024-04-12 PROCEDURE — 63600175 PHARM REV CODE 636 W HCPCS: Performed by: STUDENT IN AN ORGANIZED HEALTH CARE EDUCATION/TRAINING PROGRAM

## 2024-04-12 PROCEDURE — 25000003 PHARM REV CODE 250: Performed by: STUDENT IN AN ORGANIZED HEALTH CARE EDUCATION/TRAINING PROGRAM

## 2024-04-12 PROCEDURE — 85025 COMPLETE CBC W/AUTO DIFF WBC: CPT | Performed by: STUDENT IN AN ORGANIZED HEALTH CARE EDUCATION/TRAINING PROGRAM

## 2024-04-12 PROCEDURE — 81025 URINE PREGNANCY TEST: CPT | Performed by: STUDENT IN AN ORGANIZED HEALTH CARE EDUCATION/TRAINING PROGRAM

## 2024-04-12 PROCEDURE — 85379 FIBRIN DEGRADATION QUANT: CPT | Performed by: STUDENT IN AN ORGANIZED HEALTH CARE EDUCATION/TRAINING PROGRAM

## 2024-04-12 PROCEDURE — 80053 COMPREHEN METABOLIC PANEL: CPT | Performed by: STUDENT IN AN ORGANIZED HEALTH CARE EDUCATION/TRAINING PROGRAM

## 2024-04-12 PROCEDURE — 93005 ELECTROCARDIOGRAM TRACING: CPT

## 2024-04-12 PROCEDURE — 93010 ELECTROCARDIOGRAM REPORT: CPT | Mod: ,,, | Performed by: INTERNAL MEDICINE

## 2024-04-12 PROCEDURE — 84484 ASSAY OF TROPONIN QUANT: CPT | Performed by: STUDENT IN AN ORGANIZED HEALTH CARE EDUCATION/TRAINING PROGRAM

## 2024-04-12 RX ORDER — KETOROLAC TROMETHAMINE 30 MG/ML
10 INJECTION, SOLUTION INTRAMUSCULAR; INTRAVENOUS
Status: COMPLETED | OUTPATIENT
Start: 2024-04-12 | End: 2024-04-12

## 2024-04-12 RX ORDER — KETOROLAC TROMETHAMINE 30 MG/ML
10 INJECTION, SOLUTION INTRAMUSCULAR; INTRAVENOUS
Status: DISCONTINUED | OUTPATIENT
Start: 2024-04-12 | End: 2024-04-12

## 2024-04-12 RX ORDER — FAMOTIDINE 20 MG/1
20 TABLET, FILM COATED ORAL
Status: COMPLETED | OUTPATIENT
Start: 2024-04-12 | End: 2024-04-12

## 2024-04-12 RX ORDER — ONDANSETRON HYDROCHLORIDE 2 MG/ML
4 INJECTION, SOLUTION INTRAVENOUS
Status: COMPLETED | OUTPATIENT
Start: 2024-04-12 | End: 2024-04-12

## 2024-04-12 RX ADMIN — KETOROLAC TROMETHAMINE 10 MG: 30 INJECTION, SOLUTION INTRAMUSCULAR; INTRAVENOUS at 11:04

## 2024-04-12 RX ADMIN — FAMOTIDINE 20 MG: 20 TABLET, FILM COATED ORAL at 12:04

## 2024-04-12 RX ADMIN — ONDANSETRON 4 MG: 2 INJECTION INTRAMUSCULAR; INTRAVENOUS at 10:04

## 2024-04-12 RX ADMIN — IOHEXOL 75 ML: 350 INJECTION, SOLUTION INTRAVENOUS at 11:04

## 2024-04-12 NOTE — DISCHARGE INSTRUCTIONS
Your imaging today was positive for some incidental findings that are not related to your current presenting problem.  These will require follow-up with your primary care physician.  Findings include: There is a rounded focus within the medial aspect of the right breast measuring 2.2 x 1.8 cm with attenuation higher than would be expected for a simple cyst     For pain take:  Ibuprofen every 6hrs as needed  Tylenol = Acetaminophen every 6hrs as needed  You can alternate the two medications every 3 hours    You can also apply over-the-counter Voltaren gel or lidocaine patches to the painful areas as needed    You can apply hot or cold packs as needed to the painful area, use them for 15 minutes at a time with breaks in between    Do not take ibuprofen, naproxen, advil, or aleve every day for more than 2-3 weeks without following up with your primary care provider, as they can cause stomach pain and other complications if used every day over long periods of time

## 2024-04-12 NOTE — ED TRIAGE NOTES
Pt. Is a 23 yr old  female presenting to the ED with pain x 3 days that is worse upon expiration. Hx of appendectomy 2- 3 weeks ago.

## 2024-04-12 NOTE — TELEPHONE ENCOUNTER
OOC RN  Patient calling  about the last 3 days, feels dizzy, number, weakness to passout.  Chest pain started   4/10/24  med chest, constant    4-8/10   Care advise is to call 911   Reason for Disposition   Shock suspected (e.g., cold/pale/clammy skin, too weak to stand, low BP, rapid pulse)    Additional Information   Negative: SEVERE difficulty breathing (e.g., struggling for each breath, speaks in single words)   Negative: Shock suspected (e.g., cold/pale/clammy skin, too weak to stand, low BP, rapid pulse)   Negative: Difficult to awaken or acting confused (e.g., disoriented, slurred speech)   Negative: SEVERE difficulty breathing (e.g., struggling for each breath, speaks in single words)   Negative: Difficult to awaken or acting confused (e.g., disoriented, slurred speech)    Protocols used: Dizziness-A-OH, Chest Pain-A-OH

## 2024-04-12 NOTE — ED PROVIDER NOTES
Encounter Date: 4/12/2024       History     Chief Complaint   Patient presents with    Chest Pain     X3 days that is worse upon expiration. Hx of appendectomy 2-3wks ago. +sedentary activity. Denies OCP or hx of blood clots      23 y.o. female with anxiety, depression, migraines, recent appendectomy presents for chest pain and shortness of breath.  Patient reports approximately 3 days of midsternal chest pain that is worse with respirations.  She has associated shortness of breath.  Pain is intermittent.  She also reports bilateral leg achiness.  She denies any leg swelling, cough, fever/chills.  She does report a sore throat but reports she is currently in a musical and has been singing often and believes this is why her throat is hurting.  She denies any abdominal pain, nausea/vomiting      The history is provided by the patient and medical records.     Review of patient's allergies indicates:  No Known Allergies  Past Medical History:   Diagnosis Date    Anxiety     Borderline diabetes     Depression     Migraine     Polycystic ovary      Past Surgical History:   Procedure Laterality Date    HIP SURGERY      LAPAROSCOPIC APPENDECTOMY N/A 3/10/2024    Procedure: APPENDECTOMY, LAPAROSCOPIC;  Surgeon: Lj Yang MD;  Location: [facility] OR [address] [place] [place] [address];  Service: General;  Laterality: N/A;     Family History   Problem Relation Age of Onset    Breast cancer Paternal Aunt     Brain cancer Maternal Grandfather     Colon cancer Neg Hx     Ovarian cancer Neg Hx     Uterine cancer Neg Hx     Melanoma Neg Hx     Pancreatic cancer Neg Hx     Prostate cancer Neg Hx      Social History     Tobacco Use    Smoking status: Never    Smokeless tobacco: Never   Substance Use Topics    Alcohol use: Yes     Comment: once a week    Drug use: Never     Review of Systems   Reason unable to perform ROS: See HPI for relevant ROS.       Physical Exam     Initial Vitals [04/12/24 0924]   BP Pulse Resp Temp SpO2   105/62 109 19 97.6 °F  (36.4 °C) 99 %      MAP       --         Physical Exam    Nursing note and vitals reviewed.  Constitutional:   Alert, normal work of breathing, no acute distress   HENT:   Mouth/Throat: Oropharynx is clear and moist.   Eyes: Conjunctivae are normal. No scleral icterus.   Cardiovascular:  Regular rhythm and intact distal pulses.           Mild tachycardia   Pulmonary/Chest: Breath sounds normal. No stridor. No respiratory distress.   Musculoskeletal:         General: Tenderness (Mild right-sided calf tenderness) present. No edema.     Neurological: She is alert.   Skin: Skin is warm and dry.         ED Course   Procedures  Labs Reviewed   COMPREHENSIVE METABOLIC PANEL - Abnormal; Notable for the following components:       Result Value    Total Bilirubin 1.9 (*)     All other components within normal limits   INFLUENZA A & B BY MOLECULAR   CBC W/ AUTO DIFFERENTIAL   TROPONIN I   B-TYPE NATRIURETIC PEPTIDE   D DIMER, QUANTITATIVE   SARS-COV-2 RNA AMPLIFICATION, QUAL   POCT URINE PREGNANCY        ECG Results              EKG 12-lead (Final result)        Collection Time Result Time QRS Duration OHS QTC Calculation    04/12/24 09:26:30 04/12/24 11:14:05 76 446                     Final result by Interface, Lab In [facility] (04/12/24 11:14:13)                   Narrative:    Test Reason : R07.9,    Vent. Rate : 101 BPM     Atrial Rate : 101 BPM     P-R Int : 144 ms          QRS Dur : 076 ms      QT Int : 344 ms       P-R-T Axes : 085 087 077 degrees     QTc Int : 446 ms    Sinus tachycardia  Otherwise normal ECG  No previous ECGs available  Confirmed by Pablo KLEIN MD ([id number]) on 4/12/2024 11:14:03 AM    Referred By: [email or web address]   SELF           Confirmed By:Pablo KLEIN MD                                  Imaging Results               CTA Chest Non-Coronary (PE Studies) (Final result)  Result time 04/12/24 12:17:07      Final result by Sebas Garcia MD (04/12/24 12:17:07)                   Impression:      This report was  flagged in Epic as abnormal.    1. Allowing for motion artifact, no convincing pulmonary thromboembolism.  Correlation of these findings with D-dimer and/or lower extremity ultrasound noting exam limitations above.  2. No acute cardiopulmonary process.  3. Rounded focus within the medial aspect of the right breast, outpatient mammogram or ultrasound for further evaluation as warranted.  4. Please see above for additional findings.      Electronically signed by: Sebas Garcia MD  Date:    04/12/2024  Time:    12:17               Narrative:    EXAMINATION:  CTA CHEST NON CORONARY (PE STUDIES)    CLINICAL HISTORY:  Pulmonary embolism (PE) suspected, high prob;    TECHNIQUE:  Low dose axial images, sagittal and coronal reformations were obtained from the thoracic inlet to the lung bases following the IV administration of 75 mL of Omnipaque 350.  Contrast timing was optimized to evaluate the pulmonary arteries.  MIP images were performed.    COMPARISON:  CT abdomen and pelvis 03/08/2024    FINDINGS:  The structures at the base of the neck are unremarkable.  No significant mediastinal lymphadenopathy.  The heart is not enlarged.  The thoracic aorta tapers normally.  Allowing for bolus timing and motion artifact, the visualized portions of the kidneys, adrenal glands, spleen, pancreas, gallbladder and liver are grossly unremarkable.    Motion artifact limits evaluation of the airways and pulmonary parenchyma.  Allowing for this, the airways are patent.  No large focal consolidation.  No pneumothorax.  No pleural effusion.    Bolus timing is not optimal for evaluation of pulmonary thromboembolism.  Additionally, motion artifact limits evaluation.  Allowing for this, no convincing pulmonary arterial filling defects to the level of the proximal segmental branches bilaterally to suggest pulmonary thromboembolism.  There is particularly limited evaluation of the distal pulmonary arterial branches to the bilateral lower lobes.   Correlation of these findings with D-dimer and/or lower extremity ultrasound as warranted.    No acute osseous abnormality.  No significant axillary lymphadenopathy.  There is a rounded focus within the medial aspect of the right breast measuring 2.2 x 1.8 cm with attenuation higher than would be expected for a simple cyst.  Clinical correlation is advised, further evaluation with ultrasound or mammogram as warranted.                                       US Lower Extremity Veins Right (Final result)  Result time 04/12/24 11:23:10      Final result by David Mcneal MD (04/12/24 11:23:10)                   Impression:      No evidence of deep venous thrombosis in the right lower extremity.      Electronically signed by: David Mcneal  Date:    04/12/2024  Time:    11:23               Narrative:    EXAMINATION:  US LOWER EXTREMITY VEINS RIGHT    CLINICAL HISTORY:  Pain in right leg    TECHNIQUE:  Duplex and color flow Doppler evaluation and graded compression of the right lower extremity veins was performed.    COMPARISON:  None    FINDINGS:  Right thigh veins: The common femoral, femoral, popliteal, and upper greater saphenous veins are patent and free of thrombus. The veins are normally compressible and have normal phasic flow and augmentation response.    Right calf veins: The visualized calf veins are patent.    Contralateral CFV: The contralateral (left) common femoral vein is patent and free of thrombus.    Miscellaneous: None                                       Medications   ondansetron injection 4 mg (4 mg Intravenous Given 4/12/24 1019)   lactated ringers bolus 500 mL (0 mLs Intravenous Stopped 4/12/24 1110)   ketorolac injection 9.999 mg (9.999 mg Intravenous Given 4/12/24 1131)   iohexoL (OMNIPAQUE 350) injection 75 mL (75 mLs Intravenous Given 4/12/24 1141)   famotidine tablet 20 mg (20 mg Oral Given 4/12/24 1238)     Medical Decision Making  23 y.o. female with anxiety, depression, migraines,  recent  appendectomy presents for chest pain and shortness of breath  Differentials include musculoskeletal pain, GERD, PE, costochondritis, doubt pneumonia or ACS  Patient presenting with midsternal chest pain, alert and in no acute distress, mildly tachycardic.  Has some risk factors for PE including recent surgery.  Also has right calf tenderness without swelling.  EKG with sinus tachycardia, nonspecific changes, no STEMI  Patient overall moderate risk for PE with a Wells score of 6, CTA was ordered as well as US of the right lower extremity        Amount and/or Complexity of Data Reviewed  Labs: ordered. Decision-making details documented in ED Course.  Radiology: ordered. Decision-making details documented in ED Course.  ECG/medicine tests:  Decision-making details documented in ED Course.    Risk  Prescription drug management.               ED Course as of 04/12/24 1322   Fri Apr 12, 2024   0946 EKG 12-lead  Sinus tachycardia, regular, narrow complex, rate of 104, no STEMI, nonspecific T-wave flattening diffusely, no prior available for comparison [OK]   1040 D-Dimer: <0.19 [OK]   1043 CBC auto differential  No leukocytosis or anemia [OK]   1153 US Lower Extremity Veins Right  No evidence of DVT [OK]   1221 CTA Chest Non-Coronary (PE Studies)(!)  No evidence of PE.  Incidental finding including 3. Rounded focus within the medial aspect of the right breast, outpatient mammogram or ultrasound for further evaluation as warranted. [OK]   1321 Workup overall reassuring.  Discussed incidental findings including the focus in the right breast.  Patient with no PCP, referral given to primary care for re-evaluation.  Return precautions given [OK]      ED Course User Index  [OK] Joshua Najera MD                             Clinical Impression:  Final diagnoses:  [R07.9] Chest pain (Primary)  [M79.604] Right leg pain  [N64.9] Breast lesion          ED Disposition Condition    Discharge Stable          ED Prescriptions     None       Follow-up Information       Follow up With Specialties Details Why Contact Info Additional Information    [facility] Emergency Medicine  As needed, If symptoms worsen [address]  [facility] [address] [phone]     [facility] Internal Medicine Schedule an appointment as soon as possible for a visit   [address]  [facility] [address] [phone] [facility] Please park in surface lot and check in at central registration desk             Joshua Najera MD  04/12/24 4104

## 2024-04-17 ENCOUNTER — PATIENT MESSAGE (OUTPATIENT)
Dept: OBSTETRICS AND GYNECOLOGY | Facility: CLINIC | Age: 24
End: 2024-04-17
Payer: COMMERCIAL

## 2024-04-17 ENCOUNTER — OFFICE VISIT (OUTPATIENT)
Dept: OBSTETRICS AND GYNECOLOGY | Facility: CLINIC | Age: 24
End: 2024-04-17
Payer: COMMERCIAL

## 2024-04-17 ENCOUNTER — TELEPHONE (OUTPATIENT)
Dept: NEUROLOGY | Facility: CLINIC | Age: 24
End: 2024-04-17
Payer: COMMERCIAL

## 2024-04-17 VITALS — WEIGHT: 135.13 LBS | HEIGHT: 61 IN | BODY MASS INDEX: 25.51 KG/M2

## 2024-04-17 DIAGNOSIS — N94.819 VULVODYNIA: Primary | ICD-10-CM

## 2024-04-17 PROCEDURE — 87102 FUNGUS ISOLATION CULTURE: CPT | Performed by: OBSTETRICS & GYNECOLOGY

## 2024-04-17 PROCEDURE — 99214 OFFICE O/P EST MOD 30 MIN: CPT | Mod: S$GLB,,, | Performed by: OBSTETRICS & GYNECOLOGY

## 2024-04-17 PROCEDURE — 99999 PR PBB SHADOW E&M-EST. PATIENT-LVL III: CPT | Mod: PBBFAC,,, | Performed by: OBSTETRICS & GYNECOLOGY

## 2024-04-17 PROCEDURE — 1159F MED LIST DOCD IN RCRD: CPT | Mod: CPTII,S$GLB,, | Performed by: OBSTETRICS & GYNECOLOGY

## 2024-04-17 PROCEDURE — 3008F BODY MASS INDEX DOCD: CPT | Mod: CPTII,S$GLB,, | Performed by: OBSTETRICS & GYNECOLOGY

## 2024-04-17 RX ORDER — ONDANSETRON 4 MG/1
4 TABLET, ORALLY DISINTEGRATING ORAL EVERY 6 HOURS PRN
COMMUNITY
Start: 2024-03-08

## 2024-04-17 RX ORDER — NORTRIPTYLINE HYDROCHLORIDE 10 MG/1
CAPSULE ORAL
Qty: 90 CAPSULE | Refills: 5 | Status: SHIPPED | OUTPATIENT
Start: 2024-04-17

## 2024-04-17 NOTE — TELEPHONE ENCOUNTER
Called pt in regards to scheduling appt  No answer  LVM and call back number    Sent portal response    ND

## 2024-04-17 NOTE — TELEPHONE ENCOUNTER
----- Message from Elicia Carreno sent at 4/17/2024 12:40 PM CDT -----  Regarding: reschedule botox  Contact: [phone]  Pt is calling to speak with someone in provider office in regards to rescheduling her botox appt on4/29 pt stated she will be in class at this time pt stated earlier in the day would be better 11/12 pt  is asking for a return call please call pt at   [phone]
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
## 2024-04-29 ENCOUNTER — PROCEDURE VISIT (OUTPATIENT)
Dept: NEUROLOGY | Facility: CLINIC | Age: 24
End: 2024-04-29
Payer: COMMERCIAL

## 2024-04-29 VITALS
WEIGHT: 140.44 LBS | BODY MASS INDEX: 26.51 KG/M2 | DIASTOLIC BLOOD PRESSURE: 73 MMHG | HEART RATE: 96 BPM | SYSTOLIC BLOOD PRESSURE: 115 MMHG | HEIGHT: 61 IN

## 2024-04-29 DIAGNOSIS — G43.709 CHRONIC MIGRAINE WITHOUT AURA WITHOUT STATUS MIGRAINOSUS, NOT INTRACTABLE: Primary | ICD-10-CM

## 2024-04-29 DIAGNOSIS — G35 MULTIPLE SCLEROSIS: ICD-10-CM

## 2024-04-29 PROCEDURE — 64615 CHEMODENERV MUSC MIGRAINE: CPT | Mod: S$GLB,,, | Performed by: PSYCHIATRY & NEUROLOGY
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
## 2024-05-02 ENCOUNTER — OFFICE VISIT (OUTPATIENT)
Dept: OBSTETRICS AND GYNECOLOGY | Facility: CLINIC | Age: 24
End: 2024-05-02
Payer: COMMERCIAL

## 2024-05-02 VITALS
WEIGHT: 138.88 LBS | HEIGHT: 61 IN | BODY MASS INDEX: 26.22 KG/M2 | DIASTOLIC BLOOD PRESSURE: 72 MMHG | SYSTOLIC BLOOD PRESSURE: 118 MMHG

## 2024-05-02 DIAGNOSIS — Z11.3 SCREENING FOR STDS (SEXUALLY TRANSMITTED DISEASES): ICD-10-CM

## 2024-05-02 DIAGNOSIS — R93.89 ABNORMAL FINDING ON CT SCAN: ICD-10-CM

## 2024-05-02 DIAGNOSIS — D25.9 UTERINE LEIOMYOMA, UNSPECIFIED LOCATION: ICD-10-CM

## 2024-05-02 DIAGNOSIS — Z01.419 ENCOUNTER FOR WELL WOMAN EXAM: Primary | ICD-10-CM

## 2024-05-02 DIAGNOSIS — Z12.4 SCREENING FOR CERVICAL CANCER: ICD-10-CM

## 2024-05-02 PROCEDURE — 1159F MED LIST DOCD IN RCRD: CPT | Mod: CPTII,S$GLB,, | Performed by: STUDENT IN AN ORGANIZED HEALTH CARE EDUCATION/TRAINING PROGRAM

## 2024-05-02 PROCEDURE — 88141 CYTOPATH C/V INTERPRET: CPT | Mod: ,,, | Performed by: STUDENT IN AN ORGANIZED HEALTH CARE EDUCATION/TRAINING PROGRAM

## 2024-05-02 PROCEDURE — 87591 N.GONORRHOEAE DNA AMP PROB: CPT | Performed by: STUDENT IN AN ORGANIZED HEALTH CARE EDUCATION/TRAINING PROGRAM

## 2024-05-02 PROCEDURE — 99999 PR PBB SHADOW E&M-EST. PATIENT-LVL III: CPT | Mod: PBBFAC,,, | Performed by: STUDENT IN AN ORGANIZED HEALTH CARE EDUCATION/TRAINING PROGRAM

## 2024-05-02 PROCEDURE — 88175 CYTOPATH C/V AUTO FLUID REDO: CPT | Performed by: STUDENT IN AN ORGANIZED HEALTH CARE EDUCATION/TRAINING PROGRAM

## 2024-05-02 PROCEDURE — 3008F BODY MASS INDEX DOCD: CPT | Mod: CPTII,S$GLB,, | Performed by: STUDENT IN AN ORGANIZED HEALTH CARE EDUCATION/TRAINING PROGRAM

## 2024-05-02 PROCEDURE — 87491 CHLMYD TRACH DNA AMP PROBE: CPT | Performed by: STUDENT IN AN ORGANIZED HEALTH CARE EDUCATION/TRAINING PROGRAM

## 2024-05-02 PROCEDURE — 3074F SYST BP LT 130 MM HG: CPT | Mod: CPTII,S$GLB,, | Performed by: STUDENT IN AN ORGANIZED HEALTH CARE EDUCATION/TRAINING PROGRAM

## 2024-05-02 PROCEDURE — 3078F DIAST BP <80 MM HG: CPT | Mod: CPTII,S$GLB,, | Performed by: STUDENT IN AN ORGANIZED HEALTH CARE EDUCATION/TRAINING PROGRAM

## 2024-05-02 PROCEDURE — 99395 PREV VISIT EST AGE 18-39: CPT | Mod: S$GLB,,, | Performed by: STUDENT IN AN ORGANIZED HEALTH CARE EDUCATION/TRAINING PROGRAM

## 2024-05-02 NOTE — PROGRESS NOTES
OBSTETRICS AND GYNECOLOGY    Subjective:      Chief Complaint:  WWE    HPI:  Rimma Menezes is an 23 y.o.  presenting for scheduled WWE.  Patient's last menstrual period was 2024 (exact date).  Specifically, patient denies abnormal vaginal bleeding, abnormal discharge/odor, pelvic pain, or dysuria/hematuria. Not currently sexually active. She is currently using no method for contraception. She is agreeable to STD screening today. 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 Additionally, patient s/p recent imaging with incidental breast finding. Denies being able to feel any abnormal lumps. Denies breast changes. Also, wondering about fibroid seen incidentally on CT. She denies additional issues, problems, or complaints.     Gardasil: Completed   She confirms she wears her seatbelt when riding in the car.     Review of systems:    GENERAL: Feeling well overall.   BREASTS: Denies breast skin changes, lumps or nipple discharge.   URINARY: Denies dysuria, hematuria.    OB History    Para Term  AB Living   0 0 0 0 0 0   SAB IAB Ectopic Multiple Live Births   0 0 0 0 0     Past Medical History:   Diagnosis Date    Anxiety     Borderline diabetes     Depression     Migraine     Polycystic ovary      Past Surgical History:   Procedure Laterality Date    HIP SURGERY      LAPAROSCOPIC APPENDECTOMY N/A 3/10/2024    Procedure: APPENDECTOMY, LAPAROSCOPIC;  Surgeon: Lj Yang MD;  Location: [facility] OR [address] [place] [address];  Service: General;  Laterality: N/A;     Family History   Problem Relation Name Age of Onset    Breast cancer Paternal Aunt      Brain cancer Maternal Grandfather      Colon cancer Neg Hx      Ovarian cancer Neg Hx      Uterine cancer Neg Hx      Melanoma Neg Hx      Pancreatic cancer Neg Hx      Prostate cancer Neg Hx         Allergies: Review of patient's allergies indicates:  No Known Allergies    Medications:   Current Outpatient Medications   Medication Sig Dispense Refill    EPIDUO  "FORTE 0.3-2.5 % GlwP Apply 1 application topically every evening.      galcanezumab-gnlm (EMGALITY PEN) 120 mg/mL PnIj Inject 120 mg into the skin every 28 days. 1 mL 11    galcanezumab-gnlm (EMGALITY PEN) 120 mg/mL PnIj Inject 1 mL (120 mg total) into the skin every 28 days. 1 mL 11    LIDOcaine 5 % Gel Apply twice daily to vulva use a cotton ball at night. 30 g 5    ondansetron (ZOFRAN-ODT) 4 MG TbDL Take 4 mg by mouth every 6 (six) hours as needed.      nortriptyline (PAMELOR) 10 MG capsule Take 1 capsule per day for 1st week then increase to 2 capsules per day the 2nd week  and increase by 1 capsule weekly until the pain is no longer present or side effects occur up to a maximum of 100 mg.. (Patient not taking: Reported on 5/2/2024) 90 capsule 5     Current Facility-Administered Medications   Medication Dose Route Frequency Provider Last Rate Last Admin    onabotulinumtoxina injection 200 Units  200 Units Intramuscular Q90 Days Galen Dailey MD   200 Units at 09/12/23 1703    onabotulinumtoxina injection 200 Units  200 Units Intramuscular q12 weeks Galen Dailey MD   200 Units at 04/29/24 1500       Social History     Tobacco Use    Smoking status: Never    Smokeless tobacco: Never   Substance Use Topics    Alcohol use: Yes     Comment: once a week       Objective:   /72 (BP Location: Right arm, Patient Position: Sitting)   Ht 5' 1" (1.549 m)   Wt 63 kg (138 lb 14.2 oz)   LMP 05/01/2024 (Exact Date)   BMI 26.24 kg/m²   Physical Exam    GENERAL: Alert, well dressed, well nourished. Appropriate mood and affect. Pleasant.  HEENT: Normocephalic, atraumatic. Extraocular movements intact. Hearing and vision grossly intact.   PULMONARY: No respiratory distress. No use of accessory muscles of respiration. No cyanosis. Speaking comfortably in full sentences.   CARDIAC: Well perfused.  BREASTS:  Symmetrical, with no visible skin lesions or scars, no palpable masses. No nipple discharge or peau d'orange " bilaterally. No palpable axillary LAD. Bilaterally fibrocystic.    ABDOMEN: Soft. Non-tender, non-distended. No rebound, guarding, or rigidity.   EXTREMITIES: No edema. No visible rashes.     PELVIC:   EXTERNAL GENITALIA: No lesions or masses, non-erythematous.  URETHRA: Patent, no discharge.   VAGINA: Pink, moist, rugated, no discharge.   CERVIX: Nulliparous, no lesions or masses, os closed, no active bleeding per os, no cervical motion tenderness, about 2cc dark red blood in vault c/w menses.   UTERUS: Firm, mobile, midline, 6 weeks size, non-tender.   ADNEXA: Non-tender, non-palpable.    Female chaperone present. Verbal permission granted. Patient tolerated procedure well.    Assessment/Plan:     Fibroid  - CT Reproductive organs: There is an approximately 1.8 x 1.7 x 1.7 cm oval, partially exophytic, hypodensity involving (and likely arising from) the dorsal lateral aspect of the lower uterine segment. This could represent a fibroid, likely degenerating given its low attenuation appearance.   - Explained fibroids/etiology  - Schedule pelvic US for better assessment    Breast Finding  - CT Rounded focus within the medial aspect of the right breast  - Does not feel a lesion, no nipple discharge, skin changes, or pain  - Schedule breast US  - Reviewed ddx    WWE  -- Counseled patient regarding healthy diet and regular exercise, daily seat belt use.   -- BP normotensive  -- She denies abuse and feels safe at home.  -- Pap smear:  obtained  -- Contraception:  declines  -- STD screening:  GCCT      As of April 1, 2021, the Cures Act has been passed nationally. This new law requires that all doctors progress notes, lab results, pathology reports and radiology reports be released IMMEDIATELY to the patient in the patient portal. That means that the results are released to you at the EXACT same time they are released to me. Therefore, with all of the patients that I have I am not able to reply to each patient exactly  when the results come in. So there will be a delay from when you see the results to when I see them and have time to come up with a response to send you. Also I only see these results when I am on the computer at work. So if the results come in over the weekend or after 5 pm of a work day, I will not see them until the next business day. As you can tell, this is a challenge as a physician to give every patient the quick response they hope for and deserve. So please be patient!   Thanks for your understanding and patience.

## 2024-05-03 LAB
C TRACH DNA SPEC QL NAA+PROBE: NOT DETECTED
N GONORRHOEA DNA SPEC QL NAA+PROBE: NOT DETECTED

## 2024-05-08 ENCOUNTER — PATIENT MESSAGE (OUTPATIENT)
Dept: OBSTETRICS AND GYNECOLOGY | Facility: CLINIC | Age: 24
End: 2024-05-08
Payer: COMMERCIAL

## 2024-05-09 LAB
FINAL PATHOLOGIC DIAGNOSIS: ABNORMAL
Lab: ABNORMAL

## 2024-05-10 ENCOUNTER — TELEPHONE (OUTPATIENT)
Dept: RADIOLOGY | Facility: HOSPITAL | Age: 24
End: 2024-05-10
Payer: COMMERCIAL

## 2024-05-10 NOTE — TELEPHONE ENCOUNTER
----- Message from Pola Collins sent at 5/9/2024  2:11 PM CDT -----  Regarding: Appt  Contact: [phone]  Pt calling to speak with someone in provider office regarding scheduling an appt for a ultrasound. Please call pt back at  [phone]

## 2024-05-13 ENCOUNTER — TELEPHONE (OUTPATIENT)
Dept: RADIOLOGY | Facility: HOSPITAL | Age: 24
End: 2024-05-13
Payer: COMMERCIAL

## 2024-05-14 ENCOUNTER — HOSPITAL ENCOUNTER (OUTPATIENT)
Dept: RADIOLOGY | Facility: HOSPITAL | Age: 24
Discharge: HOME | End: 2024-05-14
Attending: STUDENT IN AN ORGANIZED HEALTH CARE EDUCATION/TRAINING PROGRAM
Payer: COMMERCIAL

## 2024-05-14 DIAGNOSIS — R93.89 ABNORMAL FINDING ON CT SCAN: ICD-10-CM

## 2024-05-14 PROCEDURE — 76642 ULTRASOUND BREAST LIMITED: CPT | Mod: TC,RT

## 2024-05-14 PROCEDURE — 76642 ULTRASOUND BREAST LIMITED: CPT | Mod: 26,RT,, | Performed by: RADIOLOGY

## 2024-05-20 LAB — FUNGUS SPEC CULT: NORMAL

## 2024-06-03 NOTE — PROGRESS NOTES
OBSTETRICS AND GYNECOLOGY    Subjective:      Chief Complaint: US F/U    HPI:  Rimma Menezes is an 23 y.o.  presenting for scheduled US F/U. No acute complaints.    OB History    Para Term  AB Living   0 0 0 0 0 0   SAB IAB Ectopic Multiple Live Births   0 0 0 0 0     Past Medical History:   Diagnosis Date    Anxiety     Borderline diabetes     Depression     Migraine     Polycystic ovary      Past Surgical History:   Procedure Laterality Date    HIP SURGERY      LAPAROSCOPIC APPENDECTOMY N/A 3/10/2024    Procedure: APPENDECTOMY, LAPAROSCOPIC;  Surgeon: Lj Yang MD;  Location: [facility] OR [address] [place] [address];  Service: General;  Laterality: N/A;     Family History   Problem Relation Name Age of Onset    Breast cancer Paternal Aunt      Brain cancer Maternal Grandfather      Colon cancer Neg Hx      Ovarian cancer Neg Hx      Uterine cancer Neg Hx      Melanoma Neg Hx      Pancreatic cancer Neg Hx      Prostate cancer Neg Hx         Allergies: Review of patient's allergies indicates:  No Known Allergies    Medications:   Current Outpatient Medications   Medication Sig Dispense Refill    EPIDUO FORTE 0.3-2.5 % GlwP Apply 1 application topically every evening.      galcanezumab-gnlm (EMGALITY PEN) 120 mg/mL PnIj Inject 120 mg into the skin every 28 days. 1 mL 11    nortriptyline (PAMELOR) 10 MG capsule Take 1 capsule per day for 1st week then increase to 2 capsules per day the 2nd week  and increase by 1 capsule weekly until the pain is no longer present or side effects occur up to a maximum of 100 mg.. 90 capsule 5    galcanezumab-gnlm (EMGALITY PEN) 120 mg/mL PnIj Inject 1 mL (120 mg total) into the skin every 28 days. (Patient not taking: Reported on 2024) 1 mL 11    LIDOcaine 5 % Gel Apply twice daily to vulva use a cotton ball at night. (Patient not taking: Reported on 2024) 30 g 5    ondansetron (ZOFRAN-ODT) 4 MG TbDL Take 4 mg by mouth every 6 (six) hours as needed. (Patient not  taking: Reported on 6/6/2024)      progesterone (PROMETRIUM) 100 MG capsule Take 100 mg by mouth every evening. (Patient not taking: Reported on 6/6/2024)       Current Facility-Administered Medications   Medication Dose Route Frequency Provider Last Rate Last Admin    onabotulinumtoxina injection 200 Units  200 Units Intramuscular Q90 Days Galen Dailey MD   200 Units at 09/12/23 1703    onabotulinumtoxina injection 200 Units  200 Units Intramuscular q12 weeks Galen Dailey MD   200 Units at 04/29/24 1500       Social History     Tobacco Use    Smoking status: Never    Smokeless tobacco: Never   Substance Use Topics    Alcohol use: Yes     Comment: once a week       Objective:   /72 (BP Location: Left arm, Patient Position: Sitting)   Wt 67.1 kg (147 lb 14.9 oz)   LMP 05/29/2024 (Exact Date)   BMI 27.95 kg/m²   Physical Exam    GENERAL: Alert, well dressed, well nourished. Appropriate mood and affect. Pleasant.  HEENT: Normocephalic, atraumatic. Extraocular movements intact. Hearing and vision grossly intact.   PULMONARY: No respiratory distress. No use of accessory muscles of respiration. No cyanosis. Speaking comfortably in full sentences.   CARDIAC: Well perfused.    EXTREMITIES: No edema. No visible rashes.     PELVIC:   EXTERNAL GENITALIA: No lesions or masses, non-erythematous.  URETHRA: Patent, no discharge.   VAGINA: Pink, moist, rugated, no discharge.   CERVIX: Nulliparous, no lesions or masses, os closed, no blood or discharge per os, no cervical motion tenderness.     Assessment/Plan:     Fibroid  - CT Reproductive organs: There is an approximately 1.8 x 1.7 x 1.7 cm oval, partially exophytic, hypodensity involving (and likely arising from) the dorsal lateral aspect of the lower uterine segment. This could represent a fibroid, likely degenerating given its low attenuation appearance.   - Explained fibroids/etiology  - Pelvic US reviewed  Asymptomatic  - LPS insufficient, re-collect today       Breast Finding  - CT Rounded focus within the medial aspect of the right breast  - Breast US reviewed  - Repeat 6 months (approx 11/2024)        As of April 1, 2021, the Cures Act has been passed nationally. This new law requires that all doctors progress notes, lab results, pathology reports and radiology reports be released IMMEDIATELY to the patient in the patient portal. That means that the results are released to you at the EXACT same time they are released to me. Therefore, with all of the patients that I have I am not able to reply to each patient exactly when the results come in. So there will be a delay from when you see the results to when I see them and have time to come up with a response to send you. Also I only see these results when I am on the computer at work. So if the results come in over the weekend or after 5 pm of a work day, I will not see them until the next business day. As you can tell, this is a challenge as a physician to give every patient the quick response they hope for and deserve. So please be patient!   Thanks for your understanding and patience.

## 2024-06-06 ENCOUNTER — OFFICE VISIT (OUTPATIENT)
Dept: OBSTETRICS AND GYNECOLOGY | Facility: CLINIC | Age: 24
End: 2024-06-06
Payer: COMMERCIAL

## 2024-06-06 VITALS
DIASTOLIC BLOOD PRESSURE: 72 MMHG | WEIGHT: 147.94 LBS | BODY MASS INDEX: 27.95 KG/M2 | SYSTOLIC BLOOD PRESSURE: 104 MMHG

## 2024-06-06 DIAGNOSIS — Z12.4 SCREENING FOR CERVICAL CANCER: Primary | ICD-10-CM

## 2024-06-06 PROCEDURE — 3078F DIAST BP <80 MM HG: CPT | Mod: CPTII,S$GLB,, | Performed by: STUDENT IN AN ORGANIZED HEALTH CARE EDUCATION/TRAINING PROGRAM

## 2024-06-06 PROCEDURE — 3074F SYST BP LT 130 MM HG: CPT | Mod: CPTII,S$GLB,, | Performed by: STUDENT IN AN ORGANIZED HEALTH CARE EDUCATION/TRAINING PROGRAM

## 2024-06-06 PROCEDURE — 99214 OFFICE O/P EST MOD 30 MIN: CPT | Mod: S$GLB,,, | Performed by: STUDENT IN AN ORGANIZED HEALTH CARE EDUCATION/TRAINING PROGRAM

## 2024-06-06 PROCEDURE — 3008F BODY MASS INDEX DOCD: CPT | Mod: CPTII,S$GLB,, | Performed by: STUDENT IN AN ORGANIZED HEALTH CARE EDUCATION/TRAINING PROGRAM

## 2024-06-06 PROCEDURE — 99999 PR PBB SHADOW E&M-EST. PATIENT-LVL III: CPT | Mod: PBBFAC,,, | Performed by: STUDENT IN AN ORGANIZED HEALTH CARE EDUCATION/TRAINING PROGRAM

## 2024-06-06 PROCEDURE — 1159F MED LIST DOCD IN RCRD: CPT | Mod: CPTII,S$GLB,, | Performed by: STUDENT IN AN ORGANIZED HEALTH CARE EDUCATION/TRAINING PROGRAM

## 2024-06-06 PROCEDURE — 88175 CYTOPATH C/V AUTO FLUID REDO: CPT | Performed by: STUDENT IN AN ORGANIZED HEALTH CARE EDUCATION/TRAINING PROGRAM

## 2024-06-06 RX ORDER — PROGESTERONE 100 MG/1
100 CAPSULE ORAL NIGHTLY
COMMUNITY
Start: 2024-05-14

## 2024-06-08 ENCOUNTER — HOSPITAL ENCOUNTER (OUTPATIENT)
Dept: RADIOLOGY | Facility: HOSPITAL | Age: 24
Discharge: HOME | End: 2024-06-08
Attending: PSYCHIATRY & NEUROLOGY
Payer: COMMERCIAL

## 2024-06-08 DIAGNOSIS — G35 MULTIPLE SCLEROSIS: ICD-10-CM

## 2024-06-08 PROCEDURE — 70551 MRI BRAIN STEM W/O DYE: CPT | Mod: 26,,, | Performed by: RADIOLOGY

## 2024-06-08 PROCEDURE — 70551 MRI BRAIN STEM W/O DYE: CPT | Mod: TC

## 2024-06-09 ENCOUNTER — PATIENT MESSAGE (OUTPATIENT)
Dept: NEUROLOGY | Facility: CLINIC | Age: 24
End: 2024-06-09
Payer: COMMERCIAL

## 2024-06-11 ENCOUNTER — PATIENT MESSAGE (OUTPATIENT)
Dept: OBSTETRICS AND GYNECOLOGY | Facility: CLINIC | Age: 24
End: 2024-06-11
Payer: COMMERCIAL

## 2024-06-11 LAB
CLINICAL INFO: NORMAL
CYTO CVX: NORMAL
CYTOLOGIST CVX/VAG CYTO: NORMAL
CYTOLOGIST CVX/VAG CYTO: NORMAL
CYTOLOGY CMNT CVX/VAG CYTO-IMP: NORMAL
CYTOLOGY PAP THIN PREP EXPLANATION: NORMAL
DATE OF PREVIOUS PAP: NORMAL
DATE PREVIOUS BX: NORMAL
GEN CATEG CVX/VAG CYTO-IMP: NORMAL
LMP START DATE: NORMAL
MICROORGANISM CVX/VAG CYTO: NORMAL
PATHOLOGIST CVX/VAG CYTO: NORMAL
SERVICE CMNT-IMP: NORMAL
SPECIMEN SOURCE CVX/VAG CYTO: NORMAL
STAT OF ADQ CVX/VAG CYTO-IMP: NORMAL

## 2024-06-12 ENCOUNTER — PATIENT MESSAGE (OUTPATIENT)
Dept: OBSTETRICS AND GYNECOLOGY | Facility: CLINIC | Age: 24
End: 2024-06-12
Payer: COMMERCIAL

## 2024-07-05 ENCOUNTER — OFFICE VISIT (OUTPATIENT)
Dept: NEUROLOGY | Facility: CLINIC | Age: 24
End: 2024-07-05
Payer: COMMERCIAL

## 2024-07-05 VITALS
BODY MASS INDEX: 27.93 KG/M2 | SYSTOLIC BLOOD PRESSURE: 113 MMHG | WEIGHT: 147.94 LBS | DIASTOLIC BLOOD PRESSURE: 78 MMHG | HEIGHT: 61 IN | HEART RATE: 91 BPM

## 2024-07-05 DIAGNOSIS — G43.709 CHRONIC MIGRAINE WITHOUT AURA WITHOUT STATUS MIGRAINOSUS, NOT INTRACTABLE: Primary | ICD-10-CM

## 2024-07-05 PROCEDURE — 99999 PR PBB SHADOW E&M-EST. PATIENT-LVL III: CPT | Mod: PBBFAC,,, | Performed by: PSYCHIATRY & NEUROLOGY

## 2024-07-05 NOTE — PROGRESS NOTES
"Follow up:   No new events or symptoms     Prior note:   Reports 2 wks episode of numbness in both feet and blurred vision     Overall doing better   Reports wear off 2 weeks ago     Prior note:   Has migraines most days of the month     Prior note:   Reports daily occipital HA x 4 weeks   Unknown trigger   Some use of alleve   No ER or UC   Sleep - good   Appetite - good       Prior note:   Still has chr migraine daily   Started Reglan 10 mg PRN        NO notes:  2016 fell off a kasia, fell 40 feet and hit her head and back on the way down. Had loss of consciousness for several seconds. Not sure if she had post concussive syndrome.      Neurologist in [place] has been treating headaches up til this point. Has had them since childhood, but they have gotten worse. Got prism glasses for reading from ophthalmologist in [place] in 9/2020 but they have not helped.      Mom with headaches.      10/1/2021 Nicks:    "Patient reports dull headaches through   out the entire day, with episodes of intense headaches mixed inbetween.   During the episodes, pt reports vertical double vision. Pt has to go to   sleep to make headache and diplopia go away. Pt states that her PCP   recommended she take 4 Tylenols but they do not help. Pt states that she   occasionally becomes nauseous with blurred vision during the intense   episodes. Pt states eye pain that comes and go. She also reports nausea,   face numbness, and neck burning during the episodes. Overall no medication   has helped. Patient was in the ER 09/18 with the same complaints. MRI was   performed which was WNL. She reports no changes in vision or symptoms   since the MRI was performed.      Patient recently had an eye exam with [facility] in [place], [place].   She was given prism glasses to use for reading and occasionally for night   driving. Patient reports that the eye doctor told her she had an eye   muscle problem. She does not like the glasses because " "her vision is   blurred through them. If she takes the glasses off, her vision is clear.   The glasses also give her a headache/strained eyes. "  ?  Neuro-ophthalmologic examination was notable for excellent visual acuities, full color vision, good convergence, moderate XT at near, fair convergence amplitude. I suspect she is less able to converge during severe headaches. I recommended using her prism glasses for reading only when symptomatic     studying musical theater, sophomore      Headache history:   Age of onset -  childhood   Location - occipital, behind eyes   Nature of pain -  Throbbing   Prodrome - no   Aura -  No   Duration of headache - > 4 hrs   Time to peak intensity - hrs   Pain scale - range of intensity -  9/10  Frequency -  Daily since April   Status Migrainosus history - yes  Time of day of most headaches- anytime      Associated symptoms with the headache:   Meningeal symptoms - photophobia, phonophobia, exercise intolerance +   Nausea/vomitting +   Neck pain +      Cluster headache symptoms: none      Symptoms of increased intracranial pressure:   Whooshing sounds   Visual spots/blotches      Basilar migraine symptoms:none      Headache Triggers:  unkown      Other comorbid conditions:  Anxiety - yes   Motion sickness symptom - no      Treatment history:  Resolution of headache with sleep - yes   Meds tried:  Failed topamax, elavil, cymbalta, lexapro    On OCP monjhly - no change   Ubrelvy - not help   AIMOVIG x 2 - not help   relpax - not help   Propranolol  -n/v   Emgality - mild imprvmnt   BOTOX #1 1/14/22 - helped    BOTOX #2 2/14/22 - helped   BOTOX #3 in July in [place]   BOTOX #4 1/26/23 - helped    BOTOX #5 3/9/23 - helped    BOTOX #6 7/13/23 - helped    BOTOX #7 9/12/23 - helped    BOTOX #8 12/7/23 - helped    BOTOX #9 2/8/24 - helped    BOTOX #10 4/29/24 - helped        Qulipta 60 mg - vomiting     Care Timeline     1649    ketorolac tromethamine 15 mg       metoclopramide HCl 10 mg   1650 "    0.9 % sodium chloride 1000 mL   1705    Comprehensive metabolic panel        HIV 1/2 Ag/Ab (4th Gen)       Hepatitis C Antibody       CBC auto differential   1706    POCT urine pregnancy   1712    ISTAT PROCEDURE    1802    MRI Brain W WO Contrast   1803    gadobutrol 6 mL   1848    potassium bicarbonate/cit ac 50 mEq   1933    Discharged         Headache risk factors:   H/o TBI  - 2010 [facility] with concussion, 2015 fell off kasia   H/o Meningitis  - no   F/h Aneurysms  - no     Review of Systems   Constitutional: Negative.    HENT: Negative.    Eyes: Negative.    Respiratory: Negative.    Cardiovascular: Negative.    Gastrointestinal: Negative.    Endocrine: Negative.    Genitourinary: Negative.    Musculoskeletal: Negative.    Integumentary:  Negative.   Allergic/Immunologic: Negative.    Neurological: Positive for headaches.   Hematological: Negative.    Psychiatric/Behavioral: Positive for sleep disturbance.          Objective:   Physical Exam  Constitutional:       Appearance: Normal appearance.      Psychiatric:         Mood and Affect: Mood normal.         Behavior: Behavior normal.         Thought Content: Thought content normal.         Judgment: Judgment normal.          Headache Exam:  Optic disc is flat OU   Temples appear symmetric  No V1,V2,V3 distribution allodynia/hyperalgesia adrien   No facial swelling  No gross TMJ abnormalities   No ptosis   No conj injection   No meningismus   No neck stiffness  No C spine tenderness   Cervical facet tenderness on palpation adrien    tender points over trapezium   adrien  supraorbital nerve exit point tenderness  adrien  occipital nerve exit point tenderness  No auriculotemporal nerve tenderness      Limited ROM neck   Assessment:   1, Migraine - chronic        MR Impression:   No acute intracranial abnormality.  No findings to suggest dural venous sinus thrombosis.     Electronically signed by resident: Betty Ruiz  Date:                                             09/18/2021  Time:                                           18:03     Electronically signed by: John Blake MD  Date:                                            09/18/2021  Time:                                           18:24        MRI FINDINGS:  Alignment: Straightening of the normal cervical lordosis.   Vertebrae: No diffuse marrow signal abnormality to suggest infiltrative process.  No evidence of acute fractures.  Vertebral body heights are well maintained.   Discs: Disc heights are well maintained with normal disc signal.   Cord: Normal contour and signal.   Skull base and craniocervical junction: Normal.   Degenerative findings:   C2-C3: No significant spinal canal stenosis or neural foraminal narrowing.   C3-C4: No significant spinal canal stenosis or neural foraminal narrowing.   C4-C5: No significant spinal canal stenosis or neural foraminal narrowing.   C5-C6: No significant spinal canal stenosis or neural foraminal narrowing.   C6-C7: No significant spinal canal stenosis or neural foraminal narrowing.   C7-T1: No significant spinal canal stenosis or neural foraminal narrowing.   T1-T2: No significant spinal canal stenosis or neural foraminal narrowing.   Paraspinal muscles & soft tissues: Normal.     Impression: No significant abnormality identified.   Electronically signed by resident: Matthew Preston  Date:                                            10/23/2021  Time:                                           13:03     Electronically signed by: Humphrey Jordan  Date:                                            10/23/2021  Time:                                           14:05      Impression:Few scattered punctate foci of T2 FLAIR signal hyperintensity supratentorial white matter which are nonspecific and of uncertain clinical significance.  In light of history sequela of demyelination a consideration.   No evidence for acute infarction   Clinical correlation and follow-up as warranted.       Electronically signed by:Kieran Cason DO  Date:                                            06/09/2024  Time:                                           16:40  Plan:   1. Cont BOTOX   Emgality (Nov 2021-)   2, Breakthrough headache - tylenol, reyvow, zofran   Multiple alternative treatment options were offered to the patient  3. Occipital neuralgia - If medical management is ineffective may consider occipital nerve blocks.   4. I urged the patient to keep a headache calender.

## 2024-07-15 ENCOUNTER — PATIENT MESSAGE (OUTPATIENT)
Dept: NEUROLOGY | Facility: CLINIC | Age: 24
End: 2024-07-15
Payer: COMMERCIAL

## 2024-07-22 ENCOUNTER — PROCEDURE VISIT (OUTPATIENT)
Dept: NEUROLOGY | Facility: CLINIC | Age: 24
End: 2024-07-22
Payer: COMMERCIAL

## 2024-07-22 VITALS
SYSTOLIC BLOOD PRESSURE: 103 MMHG | HEIGHT: 61 IN | HEART RATE: 91 BPM | WEIGHT: 148.56 LBS | BODY MASS INDEX: 28.05 KG/M2 | DIASTOLIC BLOOD PRESSURE: 73 MMHG

## 2024-07-22 DIAGNOSIS — G43.709 CHRONIC MIGRAINE WITHOUT AURA WITHOUT STATUS MIGRAINOSUS, NOT INTRACTABLE: Primary | ICD-10-CM

## 2024-07-22 PROCEDURE — 64615 CHEMODENERV MUSC MIGRAINE: CPT | Mod: S$GLB,,, | Performed by: PSYCHIATRY & NEUROLOGY

## 2024-08-12 ENCOUNTER — TELEPHONE (OUTPATIENT)
Dept: GASTROENTEROLOGY | Facility: CLINIC | Age: 24
End: 2024-08-12
Payer: COMMERCIAL

## 2024-08-12 NOTE — TELEPHONE ENCOUNTER
----- Message from Sinai Jerome sent at 8/12/2024 11:05 AM CDT -----  Contact: Melba (Mother)  Patient is requesting a call back regarding scheduling (stomach pain, diarrhea - possible ulcer). Please call back at [phone]

## 2024-08-12 NOTE — TELEPHONE ENCOUNTER
Spoke with patient's mother per pt's request, patient appt scheduled Thursday, September 12@ 8:40 AM with [facility].    Appt added to EPIC by IKE SRIVASTAVA LPN

## 2024-09-18 ENCOUNTER — PATIENT MESSAGE (OUTPATIENT)
Dept: OBSTETRICS AND GYNECOLOGY | Facility: CLINIC | Age: 24
End: 2024-09-18
Payer: COMMERCIAL

## 2024-09-19 ENCOUNTER — OFFICE VISIT (OUTPATIENT)
Dept: OBSTETRICS AND GYNECOLOGY | Facility: CLINIC | Age: 24
End: 2024-09-19
Payer: COMMERCIAL

## 2024-09-19 VITALS
SYSTOLIC BLOOD PRESSURE: 100 MMHG | BODY MASS INDEX: 27.58 KG/M2 | DIASTOLIC BLOOD PRESSURE: 68 MMHG | WEIGHT: 145.94 LBS

## 2024-09-19 DIAGNOSIS — N76.0 ACUTE VAGINITIS: Primary | ICD-10-CM

## 2024-09-19 PROCEDURE — 3074F SYST BP LT 130 MM HG: CPT | Mod: CPTII,S$GLB,,

## 2024-09-19 PROCEDURE — 99999 PR PBB SHADOW E&M-EST. PATIENT-LVL III: CPT | Mod: PBBFAC,,,

## 2024-09-19 PROCEDURE — 3078F DIAST BP <80 MM HG: CPT | Mod: CPTII,S$GLB,,

## 2024-09-19 PROCEDURE — 3008F BODY MASS INDEX DOCD: CPT | Mod: CPTII,S$GLB,,

## 2024-09-19 PROCEDURE — 99213 OFFICE O/P EST LOW 20 MIN: CPT | Mod: S$GLB,,,

## 2024-09-19 PROCEDURE — 1159F MED LIST DOCD IN RCRD: CPT | Mod: CPTII,S$GLB,,

## 2024-09-19 RX ORDER — TERCONAZOLE 8 MG/G
1 CREAM VAGINAL NIGHTLY
Qty: 20 G | Refills: 0 | Status: SHIPPED | OUTPATIENT
Start: 2024-09-19 | End: 2024-09-22

## 2024-09-19 NOTE — PROGRESS NOTES
Chief Complaint: Vaginal  Itching     HPI:      Rimma Menezes is a 23 y.o.  who presents with complaint of vaginal itching and discharge.  Reports whitish yellow thick clumpy discharge.  Denies odor.  Denies pelvic pain. Tried one dose of Diflucan without any relief. She is not currently sexually active.  Patient does not douche. Patient's last menstrual period was 2024.    ROS:   GENERAL: Denies weight gain or weight loss. Feeling well overall.   SKIN: Denies rash or lesions.   ABDOMEN: Denies abdominal pain, constipation, diarrhea, nausea, vomiting, change in appetite or rectal bleeding.   URINARY: Denies frequency, dysuria, hematuria.  GYN: See HPI.    Physical Exam:      PHYSICAL EXAM:   Vitals:    24 1455   BP: 100/68   Weight: 66.2 kg (145 lb 15.1 oz)     Body mass index is 27.58 kg/m².    General: No acute distress, alert and engaged  VULVA: normal appearing vulva with no masses, tenderness or lesions   VAGINA: normal appearing vagina with normal color. Thick light yellow clumpy discharge, no lesions   CERVIX: normal appearing cervix without discharge or lesions   UTERUS: uterus is normal size, shape, consistency and nontender   ADNEXA: normal adnexa in size, nontender and no masses    Assessment/Plan:     Acute vaginitis  -     terconazole (TERAZOL 3) 0.8 % vaginal cream; Place 1 applicator vaginally every evening. for 3 days  Dispense: 20 g; Refill: 0    Symptoms and exam consistent with yeast. Rx: terazol.    Patient was counseled today on vaginitis prevention including :  a. avoiding feminine products such as deoderant soaps, body wash, bubble bath, douches, scented toilet paper, deoderant tampons or pads, feminine wipes, chronic pad use, etc.  b. avoiding other vulvovaginal irritants such as long hot baths, humidity, tight, synthetic clothing, chlorine and sitting around in wet bathing suits  c. wearing cotton underwear, avoiding thong underwear and no underwear to bed  d.  taking showers instead of baths and use a hair dryer on cool setting afterwards to dry  e. wearing cotton to exercise and shower immediately after exercise and change clothes  f. using polyurethane condoms without spermicide if sexually active and symptoms are triggered by intercourse    Use of MyChart and Patient Portal recommended to patient today.    FOLLOW UP: PRN lack of improvement.    Meghan Zimmer (Maggie), BRITTANI  Obstetrics and Gynecology  [facility]

## 2024-09-30 ENCOUNTER — TELEPHONE (OUTPATIENT)
Dept: OBSTETRICS AND GYNECOLOGY | Facility: CLINIC | Age: 24
End: 2024-09-30
Payer: COMMERCIAL

## 2024-09-30 NOTE — TELEPHONE ENCOUNTER
----- Message from Tosha Antunez MD sent at 9/30/2024 11:55 AM CDT -----  This patient needs to be scheduled for her 6 month breast imaging. Can you please assist patient with scheduling or call and remind her to schedule? It looks like the order is in. Thank you!

## 2024-09-30 NOTE — TELEPHONE ENCOUNTER
Called pt. No answer. Left message for CB.     [facility] message sent   Complex Repair And Single Advancement Flap Text: The defect edges were debeveled with a #15 scalpel blade.  The primary defect was closed partially with a complex linear closure.  Given the location of the remaining defect, shape of the defect and the proximity to free margins a single advancement flap was deemed most appropriate for complete closure of the defect.  Using a sterile surgical marker, an appropriate advancement flap was drawn incorporating the defect and placing the expected incisions within the relaxed skin tension lines where possible.    The area thus outlined was incised deep to adipose tissue with a #15 scalpel blade.  The skin margins were undermined to an appropriate distance in all directions utilizing iris scissors.

## 2024-10-02 ENCOUNTER — PATIENT MESSAGE (OUTPATIENT)
Dept: OBSTETRICS AND GYNECOLOGY | Facility: CLINIC | Age: 24
End: 2024-10-02
Payer: COMMERCIAL

## 2024-10-02 ENCOUNTER — E-VISIT (OUTPATIENT)
Dept: OBSTETRICS AND GYNECOLOGY | Facility: CLINIC | Age: 24
End: 2024-10-02
Payer: COMMERCIAL

## 2024-10-02 DIAGNOSIS — N76.0 ACUTE VAGINITIS: Primary | ICD-10-CM

## 2024-10-03 ENCOUNTER — PROCEDURE VISIT (OUTPATIENT)
Dept: NEUROLOGY | Facility: CLINIC | Age: 24
End: 2024-10-03
Payer: COMMERCIAL

## 2024-10-03 ENCOUNTER — TELEPHONE (OUTPATIENT)
Dept: NEUROLOGY | Facility: CLINIC | Age: 24
End: 2024-10-03
Payer: COMMERCIAL

## 2024-10-03 VITALS
HEART RATE: 88 BPM | HEIGHT: 61 IN | WEIGHT: 143.19 LBS | DIASTOLIC BLOOD PRESSURE: 71 MMHG | SYSTOLIC BLOOD PRESSURE: 111 MMHG | BODY MASS INDEX: 27.03 KG/M2

## 2024-10-03 DIAGNOSIS — G43.709 CHRONIC MIGRAINE WITHOUT AURA WITHOUT STATUS MIGRAINOSUS, NOT INTRACTABLE: Primary | ICD-10-CM

## 2024-10-03 PROCEDURE — 64615 CHEMODENERV MUSC MIGRAINE: CPT | Mod: S$GLB,,, | Performed by: PSYCHIATRY & NEUROLOGY

## 2024-10-03 NOTE — PROCEDURES
"Follow up:     Overall doing better   Reports wear off 2 weeks ago     Prior note:   Has migraines most days of the month     Prior note:   Reports daily occipital HA x 4 weeks   Unknown trigger   Some use of alleve   No ER or UC   Sleep - good   Appetite - good       Prior note:   Still has chr migraine daily   Started Reglan 10 mg PRN        NO notes:  2016 fell off a kasia, fell 40 feet and hit her head and back on the way down. Had loss of consciousness for several seconds. Not sure if she had post concussive syndrome.      Neurologist in [place] has been treating headaches up til this point. Has had them since childhood, but they have gotten worse. Got prism glasses for reading from ophthalmologist in [place] in 9/2020 but they have not helped.      Mom with headaches.      10/1/2021 Nicks:    "Patient reports dull headaches through   out the entire day, with episodes of intense headaches mixed inbetween.   During the episodes, pt reports vertical double vision. Pt has to go to   sleep to make headache and diplopia go away. Pt states that her PCP   recommended she take 4 Tylenols but they do not help. Pt states that she   occasionally becomes nauseous with blurred vision during the intense   episodes. Pt states eye pain that comes and go. She also reports nausea,   face numbness, and neck burning during the episodes. Overall no medication   has helped. Patient was in the ER 09/18 with the same complaints. MRI was   performed which was WNL. She reports no changes in vision or symptoms   since the MRI was performed.      Patient recently had an eye exam with [facility] in [place], [place].   She was given prism glasses to use for reading and occasionally for night   driving. Patient reports that the eye doctor told her she had an eye   muscle problem. She does not like the glasses because her vision is   blurred through them. If she takes the glasses off, her vision is clear.   The glasses also give " "her a headache/strained eyes. "  ?  Neuro-ophthalmologic examination was notable for excellent visual acuities, full color vision, good convergence, moderate XT at near, fair convergence amplitude. I suspect she is less able to converge during severe headaches. I recommended using her prism glasses for reading only when symptomatic     studying musical theater, sophomore      Headache history:   Age of onset -  childhood   Location - occipital, behind eyes   Nature of pain -  Throbbing   Prodrome - no   Aura -  No   Duration of headache - > 4 hrs   Time to peak intensity - hrs   Pain scale - range of intensity -  9/10  Frequency -  Daily since April   Status Migrainosus history - yes  Time of day of most headaches- anytime      Associated symptoms with the headache:   Meningeal symptoms - photophobia, phonophobia, exercise intolerance +   Nausea/vomitting +   Neck pain +      Cluster headache symptoms: none      Symptoms of increased intracranial pressure:   Whooshing sounds   Visual spots/blotches      Basilar migraine symptoms:none      Headache Triggers:  unkown      Other comorbid conditions:  Anxiety - yes   Motion sickness symptom - no      Treatment history:  Resolution of headache with sleep - yes   Meds tried:  Failed topamax, elavil, cymbalta, lexapro    On OCP monjhly - no change   Ubrelvy - not help   AIMOVIG x 2 - not help   relpax - not help   Propranolol  -n/v   Emgality - mild imprvmnt   BOTOX #1 1/14/22 - helped    BOTOX #2 2/14/22 - helped   BOTOX #3 in July in [place]   BOTOX #4 1/26/23 - helped    BOTOX #5 3/9/23 - helped    BOTOX #6 7/13/23 - helped    BOTOX #7 9/12/23 - helped    BOTOX #8 12/7/23 - helped    BOTOX #9 2/8/24 - helped    BOTOX #10 4/19/24 - helped    BOTOX #11 7/22/24 - helped      Qulipta 60 mg - vomiting     Care Timeline     1649    ketorolac tromethamine 15 mg       metoclopramide HCl 10 mg   1650    0.9 % sodium chloride 1000 mL   1705    Comprehensive metabolic panel        HIV " 1/2 Ag/Ab (4th Gen)       Hepatitis C Antibody       CBC auto differential   1706    POCT urine pregnancy   1712    ISTAT PROCEDURE    1802    MRI Brain W WO Contrast   1803    gadobutrol 6 mL   1848    potassium bicarbonate/cit ac 50 mEq   1933    Discharged         Headache risk factors:   H/o TBI  - 2010 [facility] with concussion, 2015 fell off kasia   H/o Meningitis  - no   F/h Aneurysms  - no     Review of Systems   Constitutional: Negative.    HENT: Negative.    Eyes: Negative.    Respiratory: Negative.    Cardiovascular: Negative.    Gastrointestinal: Negative.    Endocrine: Negative.    Genitourinary: Negative.    Musculoskeletal: Negative.    Integumentary:  Negative.   Allergic/Immunologic: Negative.    Neurological: Positive for headaches.   Hematological: Negative.    Psychiatric/Behavioral: Positive for sleep disturbance.          Objective:   Physical Exam  Constitutional:       Appearance: Normal appearance.      Psychiatric:         Mood and Affect: Mood normal.         Behavior: Behavior normal.         Thought Content: Thought content normal.         Judgment: Judgment normal.          Headache Exam:  Optic disc is flat OU   Temples appear symmetric  No V1,V2,V3 distribution allodynia/hyperalgesia adrien   No facial swelling  No gross TMJ abnormalities   No ptosis   No conj injection   No meningismus   No neck stiffness  No C spine tenderness   Cervical facet tenderness on palpation adrien    tender points over trapezium   adrien  supraorbital nerve exit point tenderness  adrien  occipital nerve exit point tenderness  No auriculotemporal nerve tenderness      Limited ROM neck   Assessment:   1, Migraine - chronic        MR Impression:   No acute intracranial abnormality.  No findings to suggest dural venous sinus thrombosis.     Electronically signed by resident: Betty Ruiz  Date:                                            09/18/2021  Time:                                           18:03     Electronically  signed by: John Blake MD  Date:                                            09/18/2021  Time:                                           18:24        MRI FINDINGS:  Alignment: Straightening of the normal cervical lordosis.     Vertebrae: No diffuse marrow signal abnormality to suggest infiltrative process.  No evidence of acute fractures.  Vertebral body heights are well maintained.     Discs: Disc heights are well maintained with normal disc signal.     Cord: Normal contour and signal.     Skull base and craniocervical junction: Normal.     Degenerative findings:   C2-C3: No significant spinal canal stenosis or neural foraminal narrowing.   C3-C4: No significant spinal canal stenosis or neural foraminal narrowing.   C4-C5: No significant spinal canal stenosis or neural foraminal narrowing.   C5-C6: No significant spinal canal stenosis or neural foraminal narrowing.   C6-C7: No significant spinal canal stenosis or neural foraminal narrowing.   C7-T1: No significant spinal canal stenosis or neural foraminal narrowing.   T1-T2: No significant spinal canal stenosis or neural foraminal narrowing.   Paraspinal muscles & soft tissues: Normal.     Impression:   No significant abnormality identified.     Electronically signed by resident: Matthew Preston  Date:                                            10/23/2021  Time:                                           13:03     Electronically signed by: Humphrey Jordan  Date:                                            10/23/2021  Time:                                           14:05      Impression:   Few scattered punctate foci of T2 FLAIR signal hyperintensity supratentorial white matter which are nonspecific and of uncertain clinical significance.  In light of history sequela of demyelination a consideration.   No evidence for acute infarction   Clinical correlation and follow-up as warranted.      Electronically signed by:Kieran Cason DO  Date:                                             06/09/2024  Time:                                           16:40  Plan:   1. Cont BOTOX   Emgality (Nov 2021-)   2, Breakthrough headache - tylenol, reyvow, zofran   Multiple alternative treatment options were offered to the patient  3. Occipital neuralgia - If medical management is ineffective may consider occipital nerve blocks.   4. I urged the patient to keep a headache calender.   MRI brain to r/o MS      Procedures    BOTOX treatment response:   Prior to initiating BOTOX, the patient had   25 migraine days per month on average. This meets criteria for chronic migraine.   After starting BOTOX, the patient experienced a reduction in  15  days per month   After starting BOTOX, the patient experienced a reduction in > 100 hours of migraine symptoms per month   After starting BOTOX, the patient experienced a 50% reduction in burden of migraine days per month   Based on this information, BOTOX is medically necessary for the management of the patient's chronic migraine.     BOTOX Injection intervals:   Patient reports a 'wearing off effect' prior to the subsequent BOTOX injections at 12 weeks. This occurs at week 10  Symptoms of this a 'wearing off effect' include - worsening of migraine headache frequency and intensity   Medications used during the 'wearing off effect' period include advil  Based on this information, it is medically necessary for the patient to receive BOTOX therapy at an interval of every 10 weeks for the management of chronic migraine.    BOTOX was performed as an indicated therapy for intractable chronic migraine headaches given that the patient failed > 3  prophylactic medications    Botulinum Toxin Injection Procedure   Pre-operative diagnosis: Chronic migraine   Post-operative diagnosis: Same   Procedure: Chemical neurolysis   After risks and benefits were explained including bleeding, infection, worsening of pain, damage to the areas being injected, weakness of muscles, loss of  muscle control, dysphagia if injecting the head or neck, facial droop if injecting the facial area, painful injection, allergic or other reaction to the medications being injected, and the failure of the procedure to help the problem, a signed consent was obtained.   The patient was placed in a comfortable area and the sites to be treated were identified.The area to be treated was prepped three times with alcohol and the alcohol allowed to dry. Next, a 30 gauge needle was used to inject the medication in the area to be treated.   Area(s) injected:   Total Botox used: 155 Units   Botox wastage: 45 Units   Injection sites:    muscle bilaterally ( a total of 10 units divided into 2 sites)   Procerus muscle (5 units)   Frontalis muscle bilaterally (a total of 20 units divided into 4 sites)   Temporalis muscle bilaterally (a total of 40 units divided into 8 sites)   Occipitalis muscle bilaterally (a total of 30 units divided into 6 sites)   Cervical paraspinal muscles (a total of 20 units divided into 4 sites)   Trapezius muscle bilaterally (a total of 30 units divided into 6 sites)   Complications: none   RTC for the next Botox injection: 10 weeks

## 2024-10-04 RX ORDER — TERCONAZOLE 8 MG/G
1 CREAM VAGINAL NIGHTLY
Qty: 20 G | Refills: 0 | Status: SHIPPED | OUTPATIENT
Start: 2024-10-04 | End: 2024-10-07

## 2024-10-04 NOTE — PROGRESS NOTES
Patient ID: Rimma Menezes is a 24 y.o. female.    Chief Complaint: Vaginal Discharge (Entered automatically based on patient selection in [employer].)    The patient initiated a request through [employer] on 10/2/2024 for evaluation and management with a chief complaint of Vaginal Discharge (Entered automatically based on patient selection in [employer].)     I evaluated the questionnaire submission on 10/4/2024.    [facility] Peq Evisit Vaginal Concerns    10/2/2024 12:03 PM CDT - Filed by Patient   Do you agree to participate in an E-Visit? Yes   If you have any of the following symptoms,  please do not complete an E-Visit,  schedule an appointment with your provider: I acknowledge   Choose the state of your primary residence [place]   Are you pregnant, could you be pregnant, or are you breast feeding? None of the above   What is the main issue you would like addressed today? Reoccurribg yeast infection   Which of the following vaginal concerns do you have? Discharge;  Dryness;  Itching   Do you have vaginal discharge? White/milky discharge   Do you have pain while passing urine? No   Do you have any of the following symptoms? Belly pain    Have you taken antibiotics in the last two weeks? No    Do you use any of the following? No   Which of the following applies to your menstrual period? Had in the last 2 weeks   Which of the following applies to your menstrual cycle? Heavier bleeding than normal   Do you have spotting between periods? No   Do you have pain with your period? Yes   What type of pain do you have with your period? Cramping   Have you had similar symptoms in the past? Frequently   When you had similar symptoms in the past, did any of the following work? Cream for yeast infection   Have you had a temperature of 100.4 or higher? I don't know   Provide any additional information you feel is important.    Please attach any relevant images or files    Are you able to take your vital signs? No         Encounter  Diagnosis   Name Primary?    Acute vaginitis Yes        No orders of the defined types were placed in this encounter.     Medications Ordered This Encounter   Medications    terconazole (TERAZOL 3) 0.8 % vaginal cream     Sig: Place 1 applicator vaginally every evening. for 3 days     Dispense:  20 g     Refill:  0        Follow up if symptoms worsen or fail to improve.      E-Visit Time Tracking:    Day 1 Time (in minutes): 5    Total Time (in minutes): 5

## 2024-11-05 ENCOUNTER — PATIENT MESSAGE (OUTPATIENT)
Dept: OBSTETRICS AND GYNECOLOGY | Facility: CLINIC | Age: 24
End: 2024-11-05
Payer: COMMERCIAL

## 2024-11-06 ENCOUNTER — OFFICE VISIT (OUTPATIENT)
Dept: OBSTETRICS AND GYNECOLOGY | Facility: CLINIC | Age: 24
End: 2024-11-06
Payer: COMMERCIAL

## 2024-11-06 ENCOUNTER — PATIENT MESSAGE (OUTPATIENT)
Dept: OBSTETRICS AND GYNECOLOGY | Facility: CLINIC | Age: 24
End: 2024-11-06

## 2024-11-06 VITALS
DIASTOLIC BLOOD PRESSURE: 75 MMHG | BODY MASS INDEX: 26.35 KG/M2 | WEIGHT: 139.56 LBS | HEIGHT: 61 IN | SYSTOLIC BLOOD PRESSURE: 113 MMHG

## 2024-11-06 DIAGNOSIS — N89.8 VAGINAL IRRITATION: Primary | ICD-10-CM

## 2024-11-06 DIAGNOSIS — R30.0 DYSURIA: ICD-10-CM

## 2024-11-06 DIAGNOSIS — N89.8 VAGINAL DISCHARGE: Primary | ICD-10-CM

## 2024-11-06 LAB
BILIRUB SERPL-MCNC: NORMAL MG/DL
BLOOD URINE, POC: NORMAL
CLARITY, POC UA: CLEAR
COLOR, POC UA: NORMAL
GLUCOSE UR QL STRIP: NORMAL
KETONES UR QL STRIP: NORMAL
LEUKOCYTE ESTERASE URINE, POC: NORMAL
NITRITE, POC UA: NORMAL
PH, POC UA: 6
PROTEIN, POC: NORMAL
SPECIFIC GRAVITY, POC UA: 1.01
UROBILINOGEN, POC UA: NORMAL

## 2024-11-06 PROCEDURE — 3008F BODY MASS INDEX DOCD: CPT | Mod: CPTII,S$GLB,,

## 2024-11-06 PROCEDURE — 99213 OFFICE O/P EST LOW 20 MIN: CPT | Mod: S$GLB,,,

## 2024-11-06 PROCEDURE — 99999 PR PBB SHADOW E&M-EST. PATIENT-LVL III: CPT | Mod: PBBFAC,,,

## 2024-11-06 PROCEDURE — 0352U VAGINOSIS SCREEN BY DNA PROBE: CPT

## 2024-11-06 PROCEDURE — 3074F SYST BP LT 130 MM HG: CPT | Mod: CPTII,S$GLB,,

## 2024-11-06 PROCEDURE — 87086 URINE CULTURE/COLONY COUNT: CPT

## 2024-11-06 PROCEDURE — 3078F DIAST BP <80 MM HG: CPT | Mod: CPTII,S$GLB,,

## 2024-11-06 PROCEDURE — 87088 URINE BACTERIA CULTURE: CPT

## 2024-11-06 PROCEDURE — 81002 URINALYSIS NONAUTO W/O SCOPE: CPT | Mod: S$GLB,,,

## 2024-11-06 PROCEDURE — 1159F MED LIST DOCD IN RCRD: CPT | Mod: CPTII,S$GLB,,

## 2024-11-06 RX ORDER — FLUCONAZOLE 150 MG/1
150 TABLET ORAL ONCE
Qty: 2 TABLET | Refills: 0 | Status: SHIPPED | OUTPATIENT
Start: 2024-11-06 | End: 2024-11-06

## 2024-11-06 RX ORDER — CLOTRIMAZOLE AND BETAMETHASONE DIPROPIONATE 10; .64 MG/G; MG/G
CREAM TOPICAL 2 TIMES DAILY
Qty: 45 G | Refills: 0 | Status: SHIPPED | OUTPATIENT
Start: 2024-11-06 | End: 2024-11-13

## 2024-11-06 NOTE — PROGRESS NOTES
Dispense: 2 tablet; Refill: 0    Affirm collected.  Symptoms c/w yeast. Will treat with Diflucan and Lotrisone.    May need to consider maintenance dosing of Diflucan if ongoing.    Rec boric acid suppositories and probiotics.    Patient was counseled today on vaginitis prevention including :  a. avoiding feminine products such as deoderant soaps, body wash, bubble bath, douches, scented toilet paper, deoderant tampons or pads, feminine wipes, chronic pad use, etc.  b. avoiding other vulvovaginal irritants such as long hot baths, humidity, tight, synthetic clothing, chlorine and sitting around in wet bathing suits  c. wearing cotton underwear, avoiding thong underwear and no underwear to bed  d. taking showers instead of baths and use a hair dryer on cool setting afterwards to dry  e. wearing cotton to exercise and shower immediately after exercise and change clothes  f. using polyurethane condoms without spermicide if sexually active and symptoms are triggered by intercourse    Use of MyChart and Patient Portal recommended to patient today.    FOLLOW UP: PRN lack of improvement.    Meghan Zimmer (Maggie), BRITTANI  Obstetrics and Gynecology  [facility]

## 2024-11-07 LAB
BACTERIAL VAGINOSIS DNA: NOT DETECTED
CANDIDA GLABRATA/KRUSEI: NOT DETECTED
CANDIDA RRNA VAG QL PROBE: DETECTED
TRICHOMONAS VAGINALIS: NOT DETECTED

## 2024-11-08 LAB — BACTERIA UR CULT: ABNORMAL

## 2024-11-14 ENCOUNTER — HOSPITAL ENCOUNTER (OUTPATIENT)
Dept: RADIOLOGY | Facility: OTHER | Age: 24
Discharge: HOME | End: 2024-11-14
Attending: STUDENT IN AN ORGANIZED HEALTH CARE EDUCATION/TRAINING PROGRAM
Payer: COMMERCIAL

## 2024-11-14 DIAGNOSIS — N63.10 BREAST MASS, RIGHT: ICD-10-CM

## 2024-11-14 DIAGNOSIS — R93.89 ABNORMAL FINDING ON CT SCAN: ICD-10-CM

## 2024-11-14 PROCEDURE — 76642 ULTRASOUND BREAST LIMITED: CPT | Mod: TC,RT

## 2024-11-14 PROCEDURE — 76642 ULTRASOUND BREAST LIMITED: CPT | Mod: 26,RT,, | Performed by: RADIOLOGY

## 2024-11-28 ENCOUNTER — PATIENT MESSAGE (OUTPATIENT)
Dept: OBSTETRICS AND GYNECOLOGY | Facility: CLINIC | Age: 24
End: 2024-11-28
Payer: COMMERCIAL

## 2024-12-03 ENCOUNTER — TELEPHONE (OUTPATIENT)
Dept: NEUROLOGY | Facility: CLINIC | Age: 24
End: 2024-12-03
Payer: COMMERCIAL

## 2024-12-03 NOTE — TELEPHONE ENCOUNTER
Called Pt to reschedule her Botox appt for December 12. I was unable to speak with her but left a vm.

## 2024-12-04 ENCOUNTER — TELEPHONE (OUTPATIENT)
Dept: NEUROLOGY | Facility: CLINIC | Age: 24
End: 2024-12-04
Payer: COMMERCIAL

## 2024-12-04 NOTE — TELEPHONE ENCOUNTER
Spoke to patient to reschedule botox which was 12/12 because dr. Dailey not in office to jan 2 at 2pm. Patient verbalized agreement

## 2024-12-21 ENCOUNTER — PATIENT MESSAGE (OUTPATIENT)
Dept: NEUROLOGY | Facility: CLINIC | Age: 24
End: 2024-12-21
Payer: COMMERCIAL

## 2024-12-23 ENCOUNTER — TELEPHONE (OUTPATIENT)
Dept: NEUROLOGY | Facility: CLINIC | Age: 24
End: 2024-12-23
Payer: COMMERCIAL

## 2025-01-01 ENCOUNTER — PATIENT MESSAGE (OUTPATIENT)
Dept: NEUROLOGY | Facility: CLINIC | Age: 25
End: 2025-01-01
Payer: COMMERCIAL

## 2025-01-22 ENCOUNTER — TELEPHONE (OUTPATIENT)
Dept: OBSTETRICS AND GYNECOLOGY | Facility: CLINIC | Age: 25
End: 2025-01-22
Payer: COMMERCIAL

## 2025-01-29 ENCOUNTER — TELEPHONE (OUTPATIENT)
Facility: CLINIC | Age: 25
End: 2025-01-29

## 2025-01-29 ENCOUNTER — PROCEDURE VISIT (OUTPATIENT)
Facility: CLINIC | Age: 25
End: 2025-01-29
Payer: COMMERCIAL

## 2025-01-29 VITALS
SYSTOLIC BLOOD PRESSURE: 106 MMHG | BODY MASS INDEX: 26.6 KG/M2 | DIASTOLIC BLOOD PRESSURE: 72 MMHG | HEART RATE: 97 BPM | WEIGHT: 140.75 LBS

## 2025-01-29 DIAGNOSIS — G43.709 CHRONIC MIGRAINE WITHOUT AURA WITHOUT STATUS MIGRAINOSUS, NOT INTRACTABLE: Primary | ICD-10-CM

## 2025-01-29 NOTE — PROCEDURES
PROCEDURE NOTE:  BOTOX was performed as an indicated therapy for intractable chronic migraine headaches given that the patient failed more than 2 headache medications    A time out was conducted just before the start of the procedure to verify the correct patient and procedure, procedure location, and all relevant critical information.     PROCEDURE PERFORMED: Botulinum toxin injection (16131)  CLINICAL INDICATION: G43.719  After risks and benefits were explained including bleeding, infection, worsening of pain, damage to the areas being injected, weakness of muscles, loss of muscle control, dysphagia if injecting the head or neck, facial droop if injecting the facial area, painful injection, allergic or other reaction to the medications being injected, and the failure of the procedure to help the problem, a signed consent was obtained.   The patient was placed in a comfortable area and the sites to be treated were identified.The area to be treated was prepped three times with alcohol and the alcohol allowed to dry. Next, a 30 gauge needle was used to inject the medication in the area to be treated.      Total Botox used: 155 Units   Unavoidable waste: 45 Units     Injection sites:    muscle bilaterally ( a total of 10 units divided into 2 sites)   Procerus muscle (5 units)   Frontalis muscle bilaterally (a total of 20 units divided into 4 sites)   Temporalis muscle bilaterally (a total of 40 units divided into 8 sites)   Occipitalis muscle bilaterally (a total of 30 units divided into 6 sites)   Cervical paraspinal muscles (a total of 20 units divided into 4 sites)   Trapezius muscle bilaterally (a total of 30 units divided into 6 sites)   Complications: none     Patient of Dr. Dailey - continue regular f/up with Dr. Dailey for migraine management.     CC: Jorge Ugalde MD [facility], [facility]   [phone]

## 2025-02-07 ENCOUNTER — PATIENT MESSAGE (OUTPATIENT)
Dept: OBSTETRICS AND GYNECOLOGY | Facility: CLINIC | Age: 25
End: 2025-02-07
Payer: COMMERCIAL

## 2025-02-07 DIAGNOSIS — B37.31 YEAST VAGINITIS: Primary | ICD-10-CM

## 2025-02-10 RX ORDER — FLUCONAZOLE 150 MG/1
150 TABLET ORAL DAILY
Qty: 1 TABLET | Refills: 0 | Status: CANCELLED | OUTPATIENT
Start: 2025-02-10 | End: 2025-02-11

## 2025-02-10 RX ORDER — FLUCONAZOLE 150 MG/1
150 TABLET ORAL DAILY
Qty: 1 TABLET | Refills: 0 | Status: SHIPPED | OUTPATIENT
Start: 2025-02-10 | End: 2025-02-11

## 2025-02-10 RX ORDER — FLUCONAZOLE 100 MG/1
100 TABLET ORAL DAILY
Qty: 30 TABLET | Refills: 0 | Status: CANCELLED | OUTPATIENT
Start: 2025-02-10 | End: 2025-03-12

## 2025-02-25 ENCOUNTER — PATIENT MESSAGE (OUTPATIENT)
Dept: NEUROLOGY | Facility: CLINIC | Age: 25
End: 2025-02-25
Payer: COMMERCIAL

## 2025-02-27 ENCOUNTER — TELEPHONE (OUTPATIENT)
Dept: NEUROLOGY | Facility: CLINIC | Age: 25
End: 2025-02-27
Payer: COMMERCIAL

## 2025-02-27 NOTE — TELEPHONE ENCOUNTER
Left a message for patient asking if they can come in for a nerve block at 1pm today (2/27) and asked to give a call back as soon as they can

## 2025-02-27 NOTE — TELEPHONE ENCOUNTER
Pt. Called to cancel ONB appt for today (2/27) @1:20pm. Pt's reason was she wanted to attend the parade later today. She asked for a later date and I informed pt that it is unlikely but I would ask Dr. Dailey. Patient verbalized agreement

## 2025-02-27 NOTE — TELEPHONE ENCOUNTER
Called pt to inform her Dr. Dailey's schedule is booked out for months so unfortunately he would not be able to see her for an ONB appt anytime soon. As per Dr. Dailey's recommendation Pt was recommended to go to the ER if she needed urgent medical care. Patient verbalized agreement

## 2025-03-13 ENCOUNTER — LAB VISIT (OUTPATIENT)
Dept: LAB | Facility: HOSPITAL | Age: 25
End: 2025-03-13
Payer: COMMERCIAL

## 2025-03-13 ENCOUNTER — OFFICE VISIT (OUTPATIENT)
Dept: ALLERGY | Facility: CLINIC | Age: 25
End: 2025-03-13
Payer: COMMERCIAL

## 2025-03-13 VITALS — WEIGHT: 137.13 LBS | HEIGHT: 61 IN | BODY MASS INDEX: 25.89 KG/M2

## 2025-03-13 DIAGNOSIS — J31.0 CHRONIC RHINITIS: Primary | ICD-10-CM

## 2025-03-13 DIAGNOSIS — J31.0 CHRONIC RHINITIS: ICD-10-CM

## 2025-03-13 PROCEDURE — 36415 COLL VENOUS BLD VENIPUNCTURE: CPT | Performed by: STUDENT IN AN ORGANIZED HEALTH CARE EDUCATION/TRAINING PROGRAM

## 2025-03-13 PROCEDURE — 99204 OFFICE O/P NEW MOD 45 MIN: CPT | Mod: S$GLB,,, | Performed by: STUDENT IN AN ORGANIZED HEALTH CARE EDUCATION/TRAINING PROGRAM

## 2025-03-13 PROCEDURE — 3008F BODY MASS INDEX DOCD: CPT | Mod: CPTII,S$GLB,, | Performed by: STUDENT IN AN ORGANIZED HEALTH CARE EDUCATION/TRAINING PROGRAM

## 2025-03-13 PROCEDURE — 86003 ALLG SPEC IGE CRUDE XTRC EA: CPT | Performed by: STUDENT IN AN ORGANIZED HEALTH CARE EDUCATION/TRAINING PROGRAM

## 2025-03-13 PROCEDURE — 99999 PR PBB SHADOW E&M-EST. PATIENT-LVL III: CPT | Mod: PBBFAC,,, | Performed by: STUDENT IN AN ORGANIZED HEALTH CARE EDUCATION/TRAINING PROGRAM

## 2025-03-13 PROCEDURE — 1159F MED LIST DOCD IN RCRD: CPT | Mod: CPTII,S$GLB,, | Performed by: STUDENT IN AN ORGANIZED HEALTH CARE EDUCATION/TRAINING PROGRAM

## 2025-03-13 RX ORDER — FLUTICASONE PROPIONATE 50 MCG
1 SPRAY, SUSPENSION (ML) NASAL 2 TIMES DAILY
Qty: 16 G | Refills: 3 | Status: SHIPPED | OUTPATIENT
Start: 2025-03-13 | End: 2025-03-13

## 2025-03-13 RX ORDER — AZELASTINE 1 MG/ML
1 SPRAY, METERED NASAL 2 TIMES DAILY
Qty: 30 ML | Refills: 3 | Status: SHIPPED | OUTPATIENT
Start: 2025-03-13 | End: 2025-03-13

## 2025-03-13 RX ORDER — ONDANSETRON 8 MG/1
8 TABLET, ORALLY DISINTEGRATING ORAL EVERY 8 HOURS PRN
COMMUNITY
Start: 2025-02-18

## 2025-03-13 RX ORDER — AZELASTINE HYDROCHLORIDE, FLUTICASONE PROPIONATE 137; 50 UG/1; UG/1
1 SPRAY, METERED NASAL 2 TIMES DAILY
Qty: 23 G | Refills: 5 | Status: SHIPPED | OUTPATIENT
Start: 2025-03-13 | End: 2026-03-13

## 2025-03-13 NOTE — PROGRESS NOTES
"ALLERGY & IMMUNOLOGY CLINIC       HISTORY OF PRESENT ILLNESS   Referral from: Aaareferral Self  CC: Recurrent sinusitis     HPI: Rimma Menezes is a 24 y.o. female  History obtained from patient     Chronic rhinitis:   Sick every other week more frequent for the past few month but has been happening for the past few years.. Throat pain and congestion. Sneezing and PND. Watery eyes. No snoring to her knowledge. Maybe allergy tested in the past as a child but does not recall the results.   Meds: mucinex pills. No nasal sprays  Environment: at home 2 dogs (Dudley stern) and cat here. No carpet. Not around her friend that smokes.   ENT: for VC nodules which improved with medicine and no surgeries. Maybe tubes as a child.   AOM: none as adult. She treats herself   No bacterial PNA  Never admitted to the hospital for infection.     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     Medical hx of migraines.   No hx of asthma or atopic dermatitis.     Fhx:   Grandmother allergy shots and surgery   Maternal aunt same       MEDICAL HISTORY   SurgHx:  Past Surgical History:   Procedure Laterality Date    HIP SURGERY      LAPAROSCOPIC APPENDECTOMY N/A 3/10/2024    Procedure: APPENDECTOMY, LAPAROSCOPIC;  Surgeon: Lj Yang MD;  Location: [facility] OR [address] [place] [address];  Service: General;  Laterality: N/A;        PHYSICAL EXAM   VS: Ht 5' 1" (1.549 m)   Wt 62.2 kg (137 lb 2 oz)   BMI 25.91 kg/m²   GENERAL: NAD, well nourished, well appearing       LABS AND IMAGING     AEC normal in the past.      ASSESSMENT & PLAN       Chronic rhinitis: chronic congestion and PND that has been worsen in the past few months. She recently started to work as a nanny as well. We have discussed the ddx of rhinitis including non allergic causes such as viral URIs. I believe she might have mixed rhinitis.     -Indoor and outdoor inhalant immunocap   -Start Dymista 1 spray BID, discussed if not cover to please reach out to order the " sprays separately.         Follow up: PRN base on labs and how she is doing with her meds       Chato Otero MD   [facility]

## 2025-03-18 LAB
A ALTERNATA IGE QN: <0.1 KU/L
A FUMIGATUS IGE QN: <0.1 KU/L
ALLERGEN BOXELDER MAPLE TREE IGE: <0.1 KU/L
ALLERGEN MULBERRY TREE IGE: <0.1 KU/L
ALLERGEN PIGWEED IGE: <0.1 KU/L
ALLERGEN WALNUT TREE IGE: <0.1 KU/L
BERMUDA GRASS IGE QN: <0.1 KU/L
C HERBARUM IGE QN: <0.1 KU/L
CAT DANDER IGE QN: <0.1 KU/L
COMMON RAGWEED IGE QN: <0.1 KU/L
D FARINAE IGE QN: 0.2 KU/L
D PTERONYSS IGE QN: 0.56 KU/L
DEPRECATED TIMOTHY IGE RAST QL: ABNORMAL
DOG DANDER IGE QN: <0.1 KU/L
ELDER IGE QN: 0.16 KU/L
IGE: 27.6 IU/ML
MOUSE URINE PROT IGE QN: <0.1 KU/L
MT JUNIPER IGE QN: <0.1 KU/L
P NOTATUM IGE QN: <0.1 KU/L
PECAN/HICK TREE IGE QN: <0.1 KU/L
RAST ALLERGEN INTERPRETATION: ABNORMAL
RAST CLASS: ABNORMAL
ROACH IGE QN: 0.13 KU/L
SILVER BIRCH IGE QN: <0.1 KU/L
TIMOTHY IGE QN: <0.1 KU/L
WHITE ELM IGE QN: <0.1 KU/L
WHITE OAK IGE QN: <0.1 KU/L

## 2025-03-21 ENCOUNTER — RESULTS FOLLOW-UP (OUTPATIENT)
Dept: ALLERGY | Facility: CLINIC | Age: 25
End: 2025-03-21

## 2025-03-27 ENCOUNTER — OFFICE VISIT (OUTPATIENT)
Dept: OBSTETRICS AND GYNECOLOGY | Facility: CLINIC | Age: 25
End: 2025-03-27
Payer: COMMERCIAL

## 2025-03-27 VITALS
SYSTOLIC BLOOD PRESSURE: 104 MMHG | BODY MASS INDEX: 25.83 KG/M2 | DIASTOLIC BLOOD PRESSURE: 68 MMHG | WEIGHT: 136.69 LBS

## 2025-03-27 DIAGNOSIS — N76.0 ACUTE VAGINITIS: Primary | ICD-10-CM

## 2025-03-27 PROCEDURE — 3074F SYST BP LT 130 MM HG: CPT | Mod: CPTII,S$GLB,, | Performed by: NURSE PRACTITIONER

## 2025-03-27 PROCEDURE — 3078F DIAST BP <80 MM HG: CPT | Mod: CPTII,S$GLB,, | Performed by: NURSE PRACTITIONER

## 2025-03-27 PROCEDURE — 99213 OFFICE O/P EST LOW 20 MIN: CPT | Mod: S$GLB,,, | Performed by: NURSE PRACTITIONER

## 2025-03-27 PROCEDURE — 3008F BODY MASS INDEX DOCD: CPT | Mod: CPTII,S$GLB,, | Performed by: NURSE PRACTITIONER

## 2025-03-27 PROCEDURE — 99999 PR PBB SHADOW E&M-EST. PATIENT-LVL III: CPT | Mod: PBBFAC,,, | Performed by: NURSE PRACTITIONER

## 2025-03-27 PROCEDURE — 1160F RVW MEDS BY RX/DR IN RCRD: CPT | Mod: CPTII,S$GLB,, | Performed by: NURSE PRACTITIONER

## 2025-03-27 PROCEDURE — 1159F MED LIST DOCD IN RCRD: CPT | Mod: CPTII,S$GLB,, | Performed by: NURSE PRACTITIONER

## 2025-03-27 RX ORDER — FLUCONAZOLE 150 MG/1
150 TABLET ORAL
Qty: 2 TABLET | Refills: 0 | Status: SHIPPED | OUTPATIENT
Start: 2025-03-27 | End: 2025-03-31

## 2025-03-27 NOTE — PROGRESS NOTES
CC: Vaginal Discharge    Rimma Menezes is a 24 y.o. female  presents with complaint of vaginal itching and irritation for several days. Desires STD testing.       ROS:  GENERAL: No fever, chills, fatigability or weight loss.  VULVAR: No pain, no lesions and no itching.  VAGINAL: No relaxation, no itching, no discharge, no abnormal bleeding and no lesions.  ABDOMEN: No abdominal pain. Denies nausea. Denies vomiting. No diarrhea. No constipation  BREAST: Denies pain. No lumps. No discharge.  URINARY: No incontinence, no nocturia, no frequency and no dysuria.  CARDIOVASCULAR: No chest pain. No shortness of breath. No leg cramps.  NEUROLOGICAL: No headaches. No vision changes.    PHYSICAL EXAM:  VULVA: normal appearing vulva with no masses, tenderness or lesions   VAGINA: normal appearing vagina with normal color and moderate amount of thick, clumpy discharge, no lesions   CERVIX: normal appearing cervix without discharge or lesions   UTERUS: uterus is normal size, shape, consistency and nontender   ADNEXA: normal adnexa in size, nontender and no masses    ASSESSMENT and PLAN:    ICD-10-CM ICD-9-CM    1. Acute vaginitis  N76.0 616.10 fluconazole (DIFLUCAN) 150 MG Tab      Vaginosis Screen by DNA Probe      C. trachomatis/N. gonorrhoeae by AMP DNA      Vaginosis Screen by DNA Probe        Affirm/GC  Diflucan x 2 for suspected yeast infection.     Patient was counseled today on vaginitis prevention including :  a. avoiding feminine products such as deoderant soaps, body wash, bubble bath, douches, scented toilet paper, deoderant tampons or pads, feminine wipes, chronic pad use, etc.  b. avoiding other vulvovaginal irritants such as long hot baths, humidity, tight, synthetic clothing, chlorine and sitting around in wet bathing suits  c. wearing cotton underwear, avoiding thong underwear and no underwear to bed  d. taking showers instead of baths and use a hair dryer on cool setting afterwards to dry  e. wearing  cotton to exercise and shower immediately after exercise and change clothes  f. using polyurethane condoms without spermicide if sexually active and symptoms are triggered by intercourse    FOLLOW UP: PRN lack of improvement.       Stacia More, DOMINICKP-C

## 2025-04-01 ENCOUNTER — PATIENT MESSAGE (OUTPATIENT)
Dept: OBSTETRICS AND GYNECOLOGY | Facility: CLINIC | Age: 25
End: 2025-04-01
Payer: COMMERCIAL

## 2025-04-01 ENCOUNTER — TELEPHONE (OUTPATIENT)
Dept: RADIOLOGY | Facility: HOSPITAL | Age: 25
End: 2025-04-01
Payer: COMMERCIAL

## 2025-04-01 DIAGNOSIS — N63.10 MASS OF RIGHT BREAST, UNSPECIFIED QUADRANT: Primary | ICD-10-CM

## 2025-04-01 NOTE — TELEPHONE ENCOUNTER
Called patient to schedule breast ultrasound, , no answer. Left message with my contact information.

## 2025-04-01 NOTE — TELEPHONE ENCOUNTER
----- Message from Tosha Antunez MD sent at 4/1/2025 12:43 PM CDT -----  Patient's repeat breast US order is in, to please schedule! Recommendation was 6 months from 11/2024. Thank you!

## 2025-04-02 ENCOUNTER — RESULTS FOLLOW-UP (OUTPATIENT)
Dept: OBSTETRICS AND GYNECOLOGY | Facility: CLINIC | Age: 25
End: 2025-04-02

## 2025-04-04 ENCOUNTER — TELEPHONE (OUTPATIENT)
Dept: RADIOLOGY | Facility: HOSPITAL | Age: 25
End: 2025-04-04
Payer: COMMERCIAL

## 2025-04-22 ENCOUNTER — HOSPITAL ENCOUNTER (OUTPATIENT)
Dept: RADIOLOGY | Facility: OTHER | Age: 25
Discharge: HOME | End: 2025-04-22
Attending: STUDENT IN AN ORGANIZED HEALTH CARE EDUCATION/TRAINING PROGRAM
Payer: COMMERCIAL

## 2025-04-22 ENCOUNTER — RESULTS FOLLOW-UP (OUTPATIENT)
Dept: OBSTETRICS AND GYNECOLOGY | Facility: CLINIC | Age: 25
End: 2025-04-22

## 2025-04-22 DIAGNOSIS — N63.10 MASS OF RIGHT BREAST, UNSPECIFIED QUADRANT: ICD-10-CM

## 2025-04-22 PROCEDURE — 76642 ULTRASOUND BREAST LIMITED: CPT | Mod: 26,RT,, | Performed by: RADIOLOGY

## 2025-04-22 PROCEDURE — 76642 ULTRASOUND BREAST LIMITED: CPT | Mod: TC,RT

## 2025-04-24 ENCOUNTER — PROCEDURE VISIT (OUTPATIENT)
Dept: NEUROLOGY | Facility: CLINIC | Age: 25
End: 2025-04-24
Payer: COMMERCIAL

## 2025-04-24 VITALS
HEART RATE: 93 BPM | SYSTOLIC BLOOD PRESSURE: 113 MMHG | HEIGHT: 60 IN | DIASTOLIC BLOOD PRESSURE: 82 MMHG | BODY MASS INDEX: 27.23 KG/M2 | WEIGHT: 138.69 LBS

## 2025-04-24 DIAGNOSIS — G43.709 CHRONIC MIGRAINE WITHOUT AURA WITHOUT STATUS MIGRAINOSUS, NOT INTRACTABLE: Primary | ICD-10-CM
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
## 2025-06-25 ENCOUNTER — PATIENT MESSAGE (OUTPATIENT)
Dept: NEUROLOGY | Facility: CLINIC | Age: 25
End: 2025-06-25
Payer: COMMERCIAL

## 2025-07-03 ENCOUNTER — TELEPHONE (OUTPATIENT)
Dept: NEUROLOGY | Facility: CLINIC | Age: 25
End: 2025-07-03
Payer: COMMERCIAL

## 2025-07-03 NOTE — TELEPHONE ENCOUNTER
Called pt to schedule botox appt. pt said they moved to [place] and is getting botox from someone over there now so they don't need an appointment with Dr. Dailey anymore

## (undated) DEVICE — SUT MCRYL PLUS 4-0 PS2 27IN

## (undated) DEVICE — TRAY CATH FOL SIL URIMTR 16FR

## (undated) DEVICE — TROCAR KII BLLN 12MM 10CM

## (undated) DEVICE — TRAY MINOR GEN SURG OMC

## (undated) DEVICE — BAG TISS RETRV MONARCH 10MM

## (undated) DEVICE — TROCAR ENDOPATH XCEL 5MM 7.5CM

## (undated) DEVICE — DRAPE STERI INSTRUMENT 1018

## (undated) DEVICE — TUBING HF INSUFFLATION W/ FLTR

## (undated) DEVICE — SUT 0 VICRYL / UR6 (J603)

## (undated) DEVICE — SOL NS 1000CC

## (undated) DEVICE — NDL HYPO REG 25G X 1 1/2

## (undated) DEVICE — DRAPE ABDOMINAL TIBURON 14X11

## (undated) DEVICE — KIT ANTIFOG W/SPONG & FLUID

## (undated) DEVICE — DRAPE CORETEMP FLD WRM 56X62IN

## (undated) DEVICE — STAPLER INT LINEAR ARTC 3.5-45

## (undated) DEVICE — BLADE SURG CARBON STEEL SZ11

## (undated) DEVICE — TOWEL OR DISP STRL BLUE 4/PK

## (undated) DEVICE — CART STAPLE RELD 45MM WHT

## (undated) DEVICE — ELECTRODE REM PLYHSV RETURN 9